# Patient Record
Sex: FEMALE | Race: BLACK OR AFRICAN AMERICAN | Employment: FULL TIME | URBAN - METROPOLITAN AREA
[De-identification: names, ages, dates, MRNs, and addresses within clinical notes are randomized per-mention and may not be internally consistent; named-entity substitution may affect disease eponyms.]

---

## 2018-05-10 ENCOUNTER — TELEPHONE (OUTPATIENT)
Dept: BEHAVIORAL HEALTH | Facility: CLINIC | Age: 38
End: 2018-05-10

## 2018-05-10 NOTE — TELEPHONE ENCOUNTER
S:  5/10/18 Received call from Haleigh Parra (therapist/[phone])   in conference with client referring [facility] to [facility] [place].  Reported client  sees her PCP for medication management.   B:  Reported the client has been having high Anxiety, Depression and   symptoms of PTSD . Also reported client has been endorsing Suicidal  Ideation w/o plan or intent, denies SI at this time, however therapist   feels client will be at high risk for suicide if more [facility] [place] is   not followed up.  A:  [facility] [place]  Medication: Lorazepam, Sertraline  Denies medical concern  Denies drugs and alcohol abuse.   Denies legal issues.   R:  Pool message to  OP. JT

## 2018-05-16 ENCOUNTER — HOSPITAL ENCOUNTER (OUTPATIENT)
Dept: BEHAVIORAL HEALTH | Facility: CLINIC | Age: 38
Discharge: HOME | End: 2018-05-16
Attending: PSYCHIATRY & NEUROLOGY | Admitting: PSYCHIATRY & NEUROLOGY
Payer: COMMERCIAL

## 2018-05-16 ENCOUNTER — BEH TREATMENT PLAN (OUTPATIENT)
Dept: BEHAVIORAL HEALTH | Facility: CLINIC | Age: 38
End: 2018-05-16
Attending: PSYCHIATRY & NEUROLOGY

## 2018-05-16 PROCEDURE — 90791 PSYCH DIAGNOSTIC EVALUATION: CPT

## 2018-05-16 ASSESSMENT — PAIN SCALES - GENERAL: PAINLEVEL: MILD PAIN (2)

## 2018-05-16 NOTE — PROGRESS NOTES
Acknowledgement of Current Treatment Plan       I have reviewed my treatment plan with my therapist / counselor on 6/27/2018. I agree with the plan as it is written in the electronic health record.    Name Signature   Mackenzie Garnica    Name of Therapist / Counselor    Yimi GROVES , Rhoda Costello RN, Mariposa Rehman [facility]

## 2018-05-16 NOTE — PROGRESS NOTES
"MY COPING PLAN FOR SAFETY          Things that are most important to me & reasons for living: \"My Children\"              My relapse Warning Signs: If triggered by  if he is aggressive,  job is a trigger since it is stressful - anxiety  I will make my environment safer by: nothing required  When in crisis, I will use the following coping skills: (relaxation/self-soothing/distraction/activity):  Reading more positive things, music, distract self by being with kids  I will use My Support System:   Personal Supports: I can ask for reminders, support, or for them to stay with me  Trusted Friend/s:   None identified  Family Member/s : none identified                  Professional Supports: I can ask for med changes, emergency appointments, help  Psychiatrist: Zeyad Scherer  Therapist: Haleigh       Crisis Lines I can call to discuss options and to access support:  [facility]:    [place] ([facility]) [phone]   [place] (Mental Health Crisis Program) [phone]   [place]  [phone]   [place] ([facility]) [phone]   [place] [phone]   [place] ([facility]) [phone]   [place] ([place], [place], [place], [place], [facility]) [phone]   Other Crisis Lines:   [facility] (Counseling) [phone]   National Suicide Prevention [phone]   Suicide Prevention [phone]      X   I will attend my Treatment Program and talk to my one of my therapists:      X  I agree that I will report any current / recent thoughts, impulses or plans of suicide,           homicide, self injurious behavior or substance use .   X   I agree that I will not act on the above symptoms, but will follow the above plan         instead.  I can also go to the Emergency Department at [facility]: [facility] [phone] or call: 911                                "
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
## 2018-05-16 NOTE — PROGRESS NOTES
"Initial Individual Treatment Plan     Patient: Mackenzie Garnica   MRN: [id number]  : 1980  Age: 37 year old  Sex: female    Diagnostic Assessment Date / Date of Initial Individual Treatment Plan: 18      Immediate Health Concerns:  No     Immediate Safety Concerns:  Yes. Identify safety concern and plan to address: Discussed options of ED evaluation. Ultimately decided to first try the PHP. Does not have any immedate safety concners or palns to harm self or others. Acknowldges plan to go  to ED as needed or call COPE (explained COPE)    Identify the issues to be addressed in treatment:  Symptom Management, Personal Safety, Community Resources/Discharge Planning, Abstinence/Relapse Prevention, Develop / Improve Independent Living Skills and Develop Socialization / Interpersonal Relationship Skills    Client Initial Individualized Goals for Treatment: \"I would like to be able to manage my depression and anxiety\"    Initial Treatment suggestions for the client during the time between Diagnostic Assessment and completion of the Individualized Treatment Plan:  Follow Safety Plan  Abstain from Substance Use   Ask for more information, support and/or assistance as needed.  Follow up with providers/community supports as needed: therapist, PCP  Report increases or changes in symptoms to staff.  Report any personal safety concerns to staff.   Take medications as prescribed.  Report medication changes and/or side effects to staff.  Attend and participate in groups as scheduled or notify staff if unable to do so.  Report any use of substances to staff as this may impact your symptoms and/or  personal safety.  Notify staff if you have any other issues that need to be addressed. This may include  any current abuse / neglect / exploitation or other vulnerability.  Follow recommendations of your treatment team and discuss concerns if not in  agreement.     Treatment Team Responsible: [facility] ()  "     Therapeutic Interventions/Treatment Strategies may include:  Support, Redirection, Feedback, Limit/Boundaries, Safety Assessments, Structured Activity, Problem Solving, Clarification, Education, Motivational Enhancement and Relapse Prevention as needed.    Tracie Plasencia, ITALIASW

## 2018-05-16 NOTE — TELEPHONE ENCOUNTER
----- Message from RICHARD Arango sent at 5/16/2018  3:32 PM CDT -----  Regarding: new start  Mackenzie will be starting in the Partial Program on tomorrow, Thursday, 5/17/18.   She has an out of state BCBS policy, auth required    I'm working on the DA right now so if have questions, please call me directly.  Thanks!

## 2018-05-17 NOTE — TELEPHONE ENCOUNTER
----- Message from RICHARD Arango sent at 5/17/2018  9:15 AM CDT -----  Mackenzie will NOT be starting today in Partial. Will be starting in PHP tomorrow.  Thank you.

## 2018-05-18 ENCOUNTER — HOSPITAL ENCOUNTER (OUTPATIENT)
Dept: BEHAVIORAL HEALTH | Facility: CLINIC | Age: 38
End: 2018-05-18
Attending: PSYCHIATRY & NEUROLOGY
Payer: COMMERCIAL

## 2018-05-18 PROBLEM — F41.1 GAD (GENERALIZED ANXIETY DISORDER): Status: ACTIVE | Noted: 2018-05-18

## 2018-05-18 PROCEDURE — H0035 MH PARTIAL HOSP TX UNDER 24H: HCPCS

## 2018-05-18 ASSESSMENT — ANXIETY QUESTIONNAIRES
5. BEING SO RESTLESS THAT IT IS HARD TO SIT STILL: NOT AT ALL
6. BECOMING EASILY ANNOYED OR IRRITABLE: MORE THAN HALF THE DAYS
3. WORRYING TOO MUCH ABOUT DIFFERENT THINGS: NEARLY EVERY DAY
7. FEELING AFRAID AS IF SOMETHING AWFUL MIGHT HAPPEN: NOT AT ALL
GAD7 TOTAL SCORE: 11
1. FEELING NERVOUS, ANXIOUS, OR ON EDGE: SEVERAL DAYS
2. NOT BEING ABLE TO STOP OR CONTROL WORRYING: MORE THAN HALF THE DAYS
IF YOU CHECKED OFF ANY PROBLEMS ON THIS QUESTIONNAIRE, HOW DIFFICULT HAVE THESE PROBLEMS MADE IT FOR YOU TO DO YOUR WORK, TAKE CARE OF THINGS AT HOME, OR GET ALONG WITH OTHER PEOPLE: VERY DIFFICULT

## 2018-05-18 ASSESSMENT — PATIENT HEALTH QUESTIONNAIRE - PHQ9: 5. POOR APPETITE OR OVEREATING: NEARLY EVERY DAY

## 2018-05-18 NOTE — PROGRESS NOTES
"  Adult Mental Health Outpatient Group Therapy Progress Note      Client Initial Individualized Goals for Treatment: \"I would like to be able to manage my depression and anxiety\".      See Initial Treatment suggestions for the client during the time between Diagnostic Assessment and completion of the Master Individualized Treatment Plan.    Treatment Goals:     Follow Safety Plan  Abstain from Substance Use   Ask for more information, support and/or assistance as needed.  Follow up with providers/community supports as needed: therapist, PCP  Report increases or changes in symptoms to staff.  Report any personal safety concerns to staff.   Take medications as prescribed.  Report medication changes and/or side effects to staff.  Attend and participate in groups as scheduled or notify staff if unable to do so.  Report any use of substances to staff as this may impact your symptoms and/or                      personal safety.  Notify staff if you have any other issues that need to be addressed. This may include              any current abuse / neglect / exploitation or other vulnerability.  Follow recommendations of your treatment team and discuss concerns if not in            agreement.    Area of Treatment Focus:  Symptom Management, Personal Safety and Develop / Improve Independent Living Skills, wellness    Therapeutic Interventions/Treatment Strategies:  Support, Feedback, Safety Assessments, Problem Solving and Education    Response to Treatment Strategies:  Accepted Feedback, Listened, Attentive and Alert    Name of Group:  Weekend Planning  Group Time:11:00-11:50    Description and Outcome:  Group spent time discussing plans for the weekend and reflecting on this weeks programming. Patients were to fill out worksheet that identified the skills they learned this week and ways they have implemented them into their lives. They also planned activities for their weekend. Group members were encouraged to give others " feedback and brainstorm strategies to manage symptoms.     Assessment  Appearance/ Mood: anxious behavior,  range in affect, anxious mood. Affect was consistent with mood.  Appropriate dress, well-groomed and was cooperative within group.   Thought Process: Linear and logical, productive and goal directed. Contributed to group conversation.  Stated she felt nervous.   Behavior: Cooperative, interacted within the group, listened and was respectful to others  Areas for growth: Patient would benefit from balancing her schedule to incorporate movement, productivity, and other coping skills besides laying in bed. Patient was open to these ideas and was going to focus on self care this weekend: brushing teeth, meal time, etc.     Treatment goal acknowledgement: Patient continues to work towards treatment plan goals and the skills learned today will benefit on recovery.     Response: Patient was able to contribute to conversation and discussed important strategies for symptom management. Verbalized understanding.         Is this a Weekly Review of the Progress on the Treatment Plan?  No

## 2018-05-18 NOTE — PROGRESS NOTES
"Group Therapy Progress Notes     Client Initial Individualized Goals for Treatment: \"I would like to be able to manage my depression and anxiety\"     Initial Treatment suggestions for the client during the time between Diagnostic Assessment and completion of the Individualized Treatment Plan:  Follow Safety Plan  Abstain from Substance Use   Ask for more information, support and/or assistance as needed.  Follow up with providers/community supports as needed: therapist, PCP  Report increases or changes in symptoms to staff.  Report any personal safety concerns to staff.   Take medications as prescribed.  Report medication changes and/or side effects to staff.  Attend and participate in groups as scheduled or notify staff if unable to do so.  Report any use of substances to staff as this may impact your symptoms and/or personal safety.  Notify staff if you have any other issues that need to be addressed. This may include  any current abuse / neglect / exploitation or other vulnerability.  Follow recommendations of your treatment team and discuss concerns if not in  agreement.          Area of Treatment Focus:  Symptom Management, Personal Safety, Community Resources/Discharge Planning, Abstinence/Relapse Prevention, Develop / Improve Independent Living Skills and Develop Socialization / Interpersonal Relationship Skills    Therapeutic Interventions/Treatment Strategies:  Support, Safety Assessments, Structured Activity and Education    Response to Treatment Strategies:  Accepted Feedback, Listened, Focused on Goals, Attentive and Accepted Support    Name of Group:  Relaxation      Progress Note and Description:  Mackenzie gained more of a sense of self by engaging in a mindfulness exercise using art media. She was engaged in creating a picture illustrating where she is at in her recovery as well as her therapeutic needs. She processed feelings and the nature of her inner self talk. Mackenzie prcoessed her picture in " group. She has a supportive  and 7 children. She plans on resuming the partial program on Monday. Denies S/I or safety issues upon leaving the program today.     Is this a Weekly Review of the Progress on the Treatment Plan?  No

## 2018-05-19 ASSESSMENT — PATIENT HEALTH QUESTIONNAIRE - PHQ9: SUM OF ALL RESPONSES TO PHQ QUESTIONS 1-9: 22

## 2018-05-19 ASSESSMENT — ANXIETY QUESTIONNAIRES: GAD7 TOTAL SCORE: 11
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
## 2018-05-20 NOTE — PROGRESS NOTES
Mackenzie was not in attendance for first two group of the day. [place] that she had arrived late due to another appointment.

## 2018-05-21 ENCOUNTER — TELEPHONE (OUTPATIENT)
Dept: BEHAVIORAL HEALTH | Facility: CLINIC | Age: 38
End: 2018-05-21

## 2018-05-21 NOTE — TELEPHONE ENCOUNTER
OON auth is for 6 days of PHP,  Window 5/18/18 to 5/25/18. auth # is [id number]. Concurrent review with Neli at  [phone] on 5-25-18.   Per ins co,  Pt is OON as there is an in network agency in the [facility] area:  [facility] in [place], phone .   jpm

## 2018-05-21 NOTE — PROGRESS NOTES
Individualized Treatment Plan     Date of Plan: 18    Name: Mackenzie Garnica MRN: [id number]  :   1980    Programs: Adult Partial Hospitalization Program    DSM5 Diagnosis:  Major depressive disorder.   Generalized anxiety disorder.     Panic disorder without agoraphobia.   Posttraumatic stress disorder.   Alcohol use disorder.     Team Members Contributing to Plan:  Jolynn Pritchard MA/JACKSON; EH Vivar, [facility]; Abilio Miguel RN-BC; Marbin Crane MOTRMario, [facility] Center[facility]; Tali Keenan, OTR/L; Mariposa Rehman, [facility] CenterSW, BC-DMT; David Kelsey MD    Client Strengths:  Mackenzie identifies her personal strengths as: caring, creative, educated, empathetic, employed, good listener, intelligent, open to learning, open to suggestions / feedback, responsible parent, supportive, wants to learn, willing to ask questions and work history I believe in god    Client Participation in Plan:  Contributed to goals and plan   Agrees with plan   Received copy of treatment plan     Areas of Vulnerability:   Suicidal Ideation -   Anxiety with/without panic attacks  Active/history of addiction/substance abuse  Depressive symptoms  Trauma/Abuse/Neglect.     Long-Term Goals:  Knowledge about illness and management of symptoms   Maintenance of personal safety     Abuse Prevention Plan:  Safe, therapeutic environment   Safety coping plan as needed   Education regarding illness and skill development   Coordination with care providers     Discharge Criteria:  Satisfactory progress toward treatment goals   Improvement re: identified problems and symptoms   Ability to continue recovery at next level of service   Has a discharge plan in place   Has safety/coping plan in place      Anticipated Discharge Date: TBD (pending authorization if needed)    Areas of Treatment Focus       Area of Treatment Focus:  Personal Safety  Start Date:    18      Problem Description:    After gathering the above information, Mackenzie  presents the following  "high risk factors for suicide: Recent substance abuse, Poor sleep, Panic,extreme anxiety, Extreme psychic pain and Hopelessness, Worthlessness.  Mackenzie reports the following current fears or concerns for personal safety: Has SI thoughts. Did not share her paln. Has very limited support and feels she has alot of stress realted to daily life. After further discussion, she did acknowldge that she does not have immedate plans of harming herself and denies any and all thoughts of harming others. She agrees to call [employer] or [employer] if needed. She agrees to stop using alcohol to numb feelings.     Mackenzie has the following Protective Factors: Children in the home , Sense of responsibility to family, Religiosity, Positive problem-solving skills and Positive therapeutic releationships     Goal: Target Date: Ongoing Status: Active  Client will notify staff when needing assistance to develop or implement a coping plan to manage suicidal or self injurious urges.  Client will use coping plan for safety, as needed.     Upon review of the patient interview and identification of high risk factors determine individualized safety strategies alternatives and treatment plan interventions.   Client consented to co-developed safety plan, which includes  1.  calling [employer], [employer]  2. distraction with being with children.     Progress:   5/23/18: Endorses feeling safe today.        Treatment Strategies:   Assist clients in establishing / strengthening support network  Engage in safety planning when indicated  Facilitate increased self awareness     Area of Treatment Focus:  Symptom Management  Start Date:   5/23/18      Description:  \"I would like to be able to manage my depression and anxiety\".      Goal: Target Date: Ongoing Status: Active  Learn 1-2 ways to depersonalize emotional anger from others in a healthy way. Continue to practice mindfulness and find 1-2 ways to practice this skill.        Progress:   5/23/18: Would like to learn ways to " manage stressors.        Treatment Strategies:   Assist clients in establishing / strengthening support network  Assist to identify treatment goals  Engage in safety planning when indicated     Area of Treatment Focus:  Develop / Improve Independent Living Skills  Start Date:    5/23/18      Goal: Target Date: Ongoing Status: continue  In life skills Mackenzie will:  1) learn, practice, generalize 2 skills/strategies for improved lifestyle balance focusing on self compassion and self care.  2) learn, practice, generalize sensory and mindfulness based self regulation skills/strategies to support improved participation in important life roles and relationships.      Progress:   5/23/18: Mackenzie met with team members. Discussed program, process, progress. Discussed and set goals.  6/5/18: Mackenzie has learned about balance, importance of self-care and compassion as well as ways to practice mindfulness in her everyday life. She would benefit from continue work on goals as she transitions into [facility].       Treatment Strategies:   Assist clients in establishing / strengthening support network  Assist with discharge planning  Facilitate increased self awareness  Provide education regarding goal integration, lifestyle balance/routine/structure, leisure, roles and values, focused structured activity, sensory and mindfulness based self-regulation skills, interpersonal communication skills, self compassion, self awareness         Area of Treatment Focus:  Community Resources/Discharge Planning  Start Date:    5/23/18        Goal: Target Date: by discharge date Status: Active  Will develop Aftercare Planning.   Will learn ways to expand support.       Progress:   Psychiatrist/Med Mgmt: PCP . Wants to find a psychiatrist. Staff will provide resources.    Individual Therapist: Haleigh Parra    Treatment Strategies:   Assist clients in establishing / strengthening support network  Assist with discharge planning

## 2018-05-21 NOTE — TELEPHONE ENCOUNTER
Called patient to ask why they are not in group today. Requested patient to return phone call to ensure safety.

## 2018-05-21 NOTE — PROGRESS NOTES
Acknowledgement of Current Treatment Plan       I have reviewed my treatment plan with my therapist / counselor on 5/23/18.   I agree with the plan as it is written in the electronic health record.    Name:      Signature:  Mackenzie Kelsey MD  Psychiatrist    Jolynn Pritchard MA,   Psychotherapist    Teressa Arias MSW, [facility]  Psychotherapist    Abilio Miguel, FANY-BC  Nurse Liajuli Keenan, OTR/L  Occupational Therapist    Marbin Crane MOTR/L, CBIS  Occupational Therapist    Mariposa Rehman, [facility], BC-DMT  Psychotherapist

## 2018-05-22 ENCOUNTER — HOSPITAL ENCOUNTER (OUTPATIENT)
Dept: BEHAVIORAL HEALTH | Facility: CLINIC | Age: 38
End: 2018-05-22
Attending: PSYCHIATRY & NEUROLOGY
Payer: COMMERCIAL

## 2018-05-22 ENCOUNTER — TELEPHONE (OUTPATIENT)
Dept: BEHAVIORAL HEALTH | Facility: CLINIC | Age: 38
End: 2018-05-22

## 2018-05-22 PROCEDURE — H0035 MH PARTIAL HOSP TX UNDER 24H: HCPCS

## 2018-05-22 NOTE — PROGRESS NOTES
"Adult Mental Health Outpatient Group Therapy Progress Note         Client Initial Individualized Goals for Treatment: \"I would like to be able to manage my depression and anxiety\".        See Initial Treatment suggestions for the client during the time between Diagnostic Assessment and completion of the Master Individualized Treatment Plan.     Treatment Goals:      Follow Safety Plan  Abstain from Substance Use   Ask for more information, support and/or assistance as needed.  Follow up with providers/community supports as needed: therapist, PCP  Report increases or changes in symptoms to staff.  Report any personal safety concerns to staff.   Take medications as prescribed.  Report medication changes and/or side effects to staff.  Attend and participate in groups as scheduled or notify staff if unable to do so.  Report any use of substances to staff as this may impact your symptoms and/or  personal safety.  Notify staff if you have any other issues that need to be addressed. This may include  any current abuse / neglect / exploitation or other vulnerability.  Follow recommendations of your treatment team and discuss concerns if not in agreement.        Area of Treatment Focus:  Personal Safety and Community Resources/Discharge Planning    Therapeutic Interventions/Treatment Strategies:  Support, Feedback, Safety Assessments, Structured Activity, Problem Solving and Education    Response to Treatment Strategies:  Accepted Feedback, Gave Feedback, Listened, Focused on Goals, Attentive, Accepted Support and Alert    Name of Group:  Mindfulness    Description and Outcome:  Discussed impact of mindfulness on depression and anxiety and relationship to non-judgmental awareness of emotions. Group dynamics became intense unexpected;y (three other group members had a loud, disrespectful conflict with each other). Processed these events with remaining group members. Lorraine acknowledged that this was quite difficult for her, " reminded her of conflict situations that happen in her house at times. Validated her feelings.    Is this a Weekly Review of the Progress on the Treatment Plan?  No

## 2018-05-22 NOTE — PROGRESS NOTES
"Adult Mental Health Outpatient Group Therapy Progress Note         Client Initial Individualized Goals for Treatment: \"I would like to be able to manage my depression and anxiety\".        See Initial Treatment suggestions for the client during the time between Diagnostic Assessment and completion of the Master Individualized Treatment Plan.     Treatment Goals:      Follow Safety Plan  Abstain from Substance Use   Ask for more information, support and/or assistance as needed.  Follow up with providers/community supports as needed: therapist, PCP  Report increases or changes in symptoms to staff.  Report any personal safety concerns to staff.   Take medications as prescribed.  Report medication changes and/or side effects to staff.  Attend and participate in groups as scheduled or notify staff if unable to do so.  Report any use of substances to staff as this may impact your symptoms and/or  personal safety.  Notify staff if you have any other issues that need to be addressed. This may include  any current abuse / neglect / exploitation or other vulnerability.  Follow recommendations of your treatment team and discuss concerns if not in agreement.        Area of Treatment Focus:  Personal Safety and Community Resources/Discharge Planning    Therapeutic Interventions/Treatment Strategies:  Support, Feedback, Safety Assessments, Structured Activity, Problem Solving and Education    Response to Treatment Strategies:  Accepted Feedback, Gave Feedback, Listened, Focused on Goals, Attentive, Accepted Support and Alert    Name of Group:  Aftercare planning [id number]     Description and Outcome:  Aftercare planning  Client participated in an aftercare planning.  Client completed an exercise to assess needs, plan for skills to address the identified need. Client worked with group members to get ideas and resources for assistance in identified areas. Client discussed fears of returning to work and planning for requesting what " she needs to perform her job. Client demonstrated understanding of session content by creating a schedule for future use.    Is this a Weekly Review of the Progress on the Treatment Plan?  No

## 2018-05-22 NOTE — TELEPHONE ENCOUNTER
----- Message from Jamie Mcleod sent at 5/22/2018 11:53 AM CDT -----  Regarding: need more appts for PHP  Pt needs more appts for PHP starting 5/22/18 under Dr. Kelsey please.  Thanks!

## 2018-05-23 ENCOUNTER — HOSPITAL ENCOUNTER (OUTPATIENT)
Dept: BEHAVIORAL HEALTH | Facility: CLINIC | Age: 38
End: 2018-05-23
Attending: PSYCHIATRY & NEUROLOGY
Payer: COMMERCIAL

## 2018-05-23 DIAGNOSIS — F33.2 SEVERE RECURRENT MAJOR DEPRESSION WITHOUT PSYCHOTIC FEATURES (H): Primary | ICD-10-CM

## 2018-05-23 PROCEDURE — H0035 MH PARTIAL HOSP TX UNDER 24H: HCPCS

## 2018-05-23 RX ORDER — SERTRALINE HYDROCHLORIDE 100 MG/1
100 TABLET, FILM COATED ORAL DAILY
Qty: 30 TABLET | Refills: 0 | Status: SHIPPED | OUTPATIENT
Start: 2018-05-23 | End: 2021-10-14

## 2018-05-23 NOTE — PROGRESS NOTES
"  Adult Mental Health Outpatient Group Therapy Progress Note      Client Initial Individualized Goals for Treatment: \"I would like to be able to manage my depression and anxiety\".      See Initial Treatment suggestions for the client during the time between Diagnostic Assessment and completion of the Master Individualized Treatment Plan.    Treatment Goals:     Follow Safety Plan  Abstain from Substance Use   Ask for more information, support and/or assistance as needed.  Follow up with providers/community supports as needed: therapist, PCP  Report increases or changes in symptoms to staff.  Report any personal safety concerns to staff.   Take medications as prescribed.  Report medication changes and/or side effects to staff.  Attend and participate in groups as scheduled or notify staff if unable to do so.  Report any use of substances to staff as this may impact your symptoms and/or                      personal safety.  Notify staff if you have any other issues that need to be addressed. This may include              any current abuse / neglect / exploitation or other vulnerability.  Follow recommendations of your treatment team and discuss concerns if not in            agreement.    Area of Treatment Focus:  Symptom Management, Develop / Improve Independent Living Skills, Develop Socialization / Interpersonal Relationship Skills and Physical Health     Therapeutic Interventions/Treatment Strategies:  Support, Redirection, Feedback, Structured Activity, Problem Solving, Education and Motivational Enhancement Therapy    Response to Treatment Strategies:  Accepted Feedback, Listened, Attentive and Alert    Name of Group:  Exercise  Group Time: 11:00-11:50    Description and Outcome:  Group discussed the importance of Exercise. The 3 different types of exercise were discussed, cardio-vascular, strength and flexibility.Group talked about the benefits to exercise and discussed was to incorporate exercise into their " weekly routines. Group discussed mental barriers to exercise, safety tips while exercising, motivation techniques to exercise and group worked through case study to problem solve ways to incorporate exercise into daily life. Mindfulness chair yoga was practiced for 15 minutes.   Assessment  Appearance/ Mood: Calm behavior, range in affect, stable mood throughout group. Affect was consistent with mood.  Appropriate dress, well-groomed and was cooperative within group.   Thought Process: Linear and logical, productive and goal directed. Contributed to group conversation.   Behavior: Cooperative, interacted within the group, listened and was respectful to others  Areas for growth: would benefit from doing exercise to increase mood    Understanding of the lesson: verbalized understanding by participating in group conversation.       Is this a Weekly Review of the Progress on the Treatment Plan

## 2018-05-23 NOTE — PROGRESS NOTES
[facility]   Dr. Kelsey's Psychiatric Progress Note  2018      Patient:  Mackenzie Garnica   Medical Record Number:  [id number]  :  1980          Interim History:   The patient's care was discussed with the treatment team and chart notes were reviewed.  Patient is doing better, less depressed.  Crying every day.  Anxiety is gone last few days.  She had SI off and on since middle school.  Suicidal thoughts are more last couple of years due to losses in her life.  Feels safe.  No plan or intent to hurt self.  Admits it's hard to come to group, but if she weren't here, she'd be home drinking ETOH.      Psychiatric ROS:  Mood:   less depressed; not anxious;  middle of the day is better  Sleep:normal  Appetite:  Not hungry but makes selfeat  Eating:normal  Energy Level:LOW  Concentration/Memory Problems:  NO  Suicidal Thoughts:Yes , off and on, no plan or intent; able to contract no self harm  Homicidal Thoughts:No  Psychotic Symptoms: No  Medication Side Effects:No  Medication Compliance:Yes   Physical Complaints:negative         Medications:     PAST MEDS:  Zoloft and Ativan    Current Outpatient Prescriptions   Medication Sig     LORazepam (ATIVAN PO) Take by mouth daily as needed for anxiety     SERTRALINE HCL PO Take 50 mg by mouth daily     No current facility-administered medications for this encounter.              Allergies:   No Known Allergies         Psychiatric Examination:   There were no vitals taken for this visit.  Weight is 0 lbs 0 oz  There is no height or weight on file to calculate BMI.    Appearance:  awake, alert and adequately groomed  Attitude:  cooperative  Eye Contact:  fair  Mood:  depressed  Affect:  mood congruent  Speech:  clear, coherent  Psychomotor Behavior:  no evidence of tardive dyskinesia, dystonia, or tics  Throught Process:  logical  Associations:  no loose associations  Thought Content:  no evidence of psychotic thought and  intermittent suicidal thoughts without plan or intent  Insight:  fair  Judgement:  fair  Oriented to:  time, person, and place  Attention Span and Concentration:  intact  Recent and Remote Memory:  intact  Gait:Normal    Risk/Potential for Dangerousness:  Multiple Active Diagnoses:HIGH  Self Care:HIGH  Suicide:MODERATE  Assault:LOW  Self Injurious Behaviors:LOW  Inappropriate Sexual Behavior:LOW         Labs:   No results found for this or any previous visit (from the past 24 hour(s)).     No results found for this or any previous visit (from the past 1008 hour(s)).      Impression:   This is a 37 year old female continues PHP for mood stabilization.  Mood is a little better.  Structure of the group keeps her from drinking.           DIagnoses:     Axis I:     1.  Major depressive disorder.   2.  Generalized anxiety disorder.     3.  Panic disorder without agoraphobia.   4.  Posttraumatic stress disorder.   5.  Alcohol use disorder.   Axis II:  No diagnosis   Axis III:  Obesity.            Plan:     Continue [facility].   IncreasedZoloft from 50 mg daily to 100 mg daily.   We will reevaluate psychotropic medications during her partial hospital stay.   Expect stabilization and completion of Partial Hospital Program.   Ms. Garnica gets medication management from her primary care physician, Dr. Zeyad Scherer at [facility] and sees therapist, Haleigh Diaz in [place].     David Kelsey MD

## 2018-05-24 ENCOUNTER — HOSPITAL ENCOUNTER (OUTPATIENT)
Dept: BEHAVIORAL HEALTH | Facility: CLINIC | Age: 38
End: 2018-05-24
Attending: PSYCHIATRY & NEUROLOGY
Payer: COMMERCIAL

## 2018-05-24 PROCEDURE — H0035 MH PARTIAL HOSP TX UNDER 24H: HCPCS

## 2018-05-24 NOTE — PROGRESS NOTES
"Adult Mental Health Outpatient Group Therapy Progress Note     Client Initial Individualized Goals for Treatment: I would like to be able to manage my depression and anxiety\".     Client will notify staff when needing assistance to develop or implement a coping plan to manage suicidal or self injurious urges.  Client will use coping plan for safety, as needed.  Learn 1-2 ways to depersonalize emotional anger from others in a healthy way. Continue to practice mindfulness and find 1-2 ways to practice this skill.    1) learn, practice, generalize 2 skills/strategies for improved lifestyle balance focusing on self compassion and self care.  2) learn, practice, generalize sensory and mindfulness based self regulation skills/strategies to support improved participation in important life roles and relationships.  Will develop Aftercare Planning.   Will learn ways to expand support.    Area of Treatment Focus:  Symptom Management, Personal Safety and Develop Socialization / Interpersonal Relationship Skills    Therapeutic Interventions/Treatment Strategies:  Support, Feedback, Safety Assessments, Structured Activity, Problem Solving, Clarification and Education    Response to Treatment Strategies:  Accepted Feedback, Gave Feedback, Listened, Focused on Goals, Attentive and Alert    Name of Group:  Interpersonal communication [id number]     Description and Outcome:  Interpersonal Communication  Client participated in a session on communication strategies.  Information on biology, environment, and previous experience affecting arousal was presented.  That was linked to communication and skills were presented to decrease arousal prior to communication.  Components of whole communication were presented and discussed among group members.  Examples were generated and problem solving barriers to effective communication were presented and discussed among group members. Client asked questions to better understand using the skills " with different people in her life  Client demonstrated understanding by participating in problem solving and giving feedback.    Is this a Weekly Review of the Progress on the Treatment Plan?  No

## 2018-05-24 NOTE — PROGRESS NOTES
"Adult Mental Health Outpatient Group Therapy Progress Note     Date: 5/22/18    Client Initial Individualized Goals for Treatment: \"I would like to be able to manage my depression and anxiety\".       See Initial Treatment suggestions for the client during the time between Diagnostic Assessment and completion of the Master Individualized Treatment Plan    Follow Safety Plan  Abstain from Substance Use   Ask for more information, support and/or assistance as needed.  Follow up with providers/community supports as needed: therapist, PCP  Report increases or changes in symptoms to staff.  Report any personal safety concerns to staff.   Take medications as prescribed.  Report medication changes and/or side effects to staff.  Attend and participate in groups as scheduled or notify staff if unable to do so.  Report any use of substances to staff as this may impact your symptoms and/or personal safety.  Notify staff if you have any other issues that need to be addressed. This may include any current abuse / neglect / exploitation or other vulnerability.  Follow recommendations of your treatment team and discuss concerns if not in agreement.     Area of Treatment Focus:  Symptom Management, Personal Safety and Develop / Improve Independent Living Skills    Therapeutic Interventions/Treatment Strategies:  Support, Feedback, Safety Assessments, Structured Activity, Education and weighted materials and sensory modalities    Response to Treatment Strategies:  Accepted Feedback, Listened, Accepted Support, Alert and Distracted    Name of Group: Life Skills [id number]     Description and Outcome:  Mackenzie participated in a psycho-educational group focused on lifestyle balance with an emphasis on leisure. Psycho-education on the benefits of leisure activity specifically the positive impact it can have on activating the parasympathetic nervous system. Group members engaged in an experiential group semi structured leisure activity " which incorporates both communication skills and opportunity to work on self-talk. Mackenzie is engaged, bright affect throughout, validating to peers. Mackenzie would benefit from additional opportunities to practice the content to be able to generalize it to her everyday life with increased intentionality, consistency, and efficacy.     Is this a Weekly Review of the Progress on the Treatment Plan?  No

## 2018-05-24 NOTE — PROGRESS NOTES
"  Adult Mental Health Outpatient Group Therapy Progress Note     Client Initial Individualized Goals for Treatment: I would like to be able to manage my depression and anxiety\".      Client will notify staff when needing assistance to develop or implement a coping plan to manage suicidal or self injurious urges.  Client will use coping plan for safety, as needed.  Learn 1-2 ways to depersonalize emotional anger from others in a healthy way. Continue to practice mindfulness and find 1-2 ways to practice this skill.    1) learn, practice, generalize 2 skills/strategies for improved lifestyle balance focusing on self compassion and self care.  2) learn, practice, generalize sensory and mindfulness based self regulation skills/strategies to support improved participation in important life roles and relationships.  Will develop Aftercare Planning.   Will learn ways to expand support.     Area of Treatment Focus:  Symptom Management and Develop Socialization / Interpersonal Relationship Skills    Therapeutic Interventions/Treatment Strategies:  Support, Feedback, Structured Activity and Education    Response to Treatment Strategies:  Accepted Feedback, Listened, Attentive, Accepted Support and Alert    Name of Group:  Mental Health Management, [id number], Self Awareness [id number]     Description and Outcome:  Mental health management: The group participated in a structured, psychoeducational discussion and activity comparing and contrasting self esteem and self compassion.  Self esteem was presented as a useful, but limiting construct, as if is prone to change with circumstances and relies on comparisons to others.  On the other hand, self compassion can be described as breathing the self kindly, especially during struggle.  Self compassion facilitates connection to others.  Handouts, including exercises to practise self compassion, were given. Mackenzie verbalized understanding of topic by sharing her struggle with being " kind to self.  She voiced a willingness to practise self compassion.     Self Awareness:  The group was provided with a guided breathing and warmup structure with focus on increasing self awareness and on providing an embodied experience.  Discussion included the importance of listening to body cues as a way of identifying emotion as well as accompanying needs and wants, and as a way of practising self compassion, authenticity, mindfulness, self expression,  and connection to other.  Client demonstrated understanding of session by participating in experiential and verbalized understanding by sharing observation of personal change during group. Mackenzie shared her observation that she was able to relax when focusing on her breath, taking time to be present with self.    Is this a Weekly Review of the Progress on the Treatment Plan?  No

## 2018-05-24 NOTE — PROGRESS NOTES
"Adult Mental Health Outpatient Group Therapy Progress Note     Client Initial Individualized Goals for Treatment: \"I would like to be able to manage my depression and anxiety\".        See Initial Treatment suggestions for the client during the time between Diagnostic Assessment and completion of the Master Individualized Treatment Plan    Follow Safety Plan  Abstain from Substance Use   Ask for more information, support and/or assistance as needed.  Follow up with providers/community supports as needed: therapist, PCP  Report increases or changes in symptoms to staff.  Report any personal safety concerns to staff.   Take medications as prescribed.  Report medication changes and/or side effects to staff.  Attend and participate in groups as scheduled or notify staff if unable to do so.  Report any use of substances to staff as this may impact your symptoms and/or personal safety.  Notify staff if you have any other issues that need to be addressed. This may include any current abuse / neglect / exploitation or other vulnerability.  Follow recommendations of your treatment team and discuss concerns if not in agreement.     Area of Treatment Focus:  Symptom Management, Personal Safety and Develop / Improve Independent Living Skills    Therapeutic Interventions/Treatment Strategies:  Support, Feedback, Safety Assessments, Structured Activity, Education and weighted materials and sensory modalities    Response to Treatment Strategies:  Accepted Feedback, Listened, Accepted Support, Alert and Distracted    Name of Group:  OT Life Skills [id number]     Description and Outcome:  Mackenzie participated in a psycho-educational group focused on learning about our sensory system and how sensory input can be helpful in managing symptoms and stress.  Mackenzie reported she was unfamiliar with this concept.  We also discussed how we can use different activities and types of sensory input throughout our day to help with self regulation. "  Mackenzie tried the weighted blanket and found it helpful.  Mood was depressed and anxious.  Appeared to have difficulty concentrating at times.  Open to support and ideas from others.  Client would benefit from additional opportunities to practice and implement content from this session.    Is this a Weekly Review of the Progress on the Treatment Plan?  No

## 2018-05-25 ENCOUNTER — HOSPITAL ENCOUNTER (OUTPATIENT)
Dept: BEHAVIORAL HEALTH | Facility: CLINIC | Age: 38
End: 2018-05-25
Attending: PSYCHIATRY & NEUROLOGY
Payer: COMMERCIAL

## 2018-05-25 PROCEDURE — H0035 MH PARTIAL HOSP TX UNDER 24H: HCPCS

## 2018-05-25 NOTE — PROGRESS NOTES
Came in for last 10 minutes of group, and will not be charged. After group patient wanted to express worry about this weekend. She stated that there is a memorial service for a family/friend that was killed a couple weeks ago. She stated she doesn't want to go because it would not be the best place to be. She was looking for support over the weekend as she is very nervous.   Resources were given:  Free/inexpensive things to do in [facility]  Volunteering opportunities  AA meeting-for support, found one in [facility] that was open to public    Mackenzie accepted these resources.
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
## 2018-05-29 ENCOUNTER — HOSPITAL ENCOUNTER (OUTPATIENT)
Dept: BEHAVIORAL HEALTH | Facility: CLINIC | Age: 38
End: 2018-05-29
Attending: PSYCHIATRY & NEUROLOGY
Payer: COMMERCIAL

## 2018-05-29 VITALS — BODY MASS INDEX: 41.12 KG/M2 | HEIGHT: 67 IN | WEIGHT: 262 LBS

## 2018-05-29 PROCEDURE — H0035 MH PARTIAL HOSP TX UNDER 24H: HCPCS

## 2018-05-29 NOTE — PROGRESS NOTES
"RN Review of Medical History / Physical Health Screen  Outpatient Behavioral Programs      CLIENT'S NAME: Mackenzie Garnica  MRN:   [id number]  :   1980 AGE:37 year old SEX: female    DATE OF DIAGNOSTIC ASSESSMENT: 18  DATE OF ADMISSION: 18   PROGRAM: [facility] ()      Following admission, the RN reviewed the following:    - Medical History / Physical Health Screen completed during the DA noted above.  - Immediate Health Concerns as noted on the Initial Individual Treatment Plan.    Client height and weight recorded by RN in epic: yes    BMI Review:  Was the patient informed of BMI? yes      Findings Above,  General nutrition education       RN Recommendations include: Continue to educate on nutrition and exercise. Educate on the importance to monitor regularly and if increase continues follow up with primary provider.       18 1500   Height and Weight   Height 5' 7\" (1.702 m)   Height Method Stated   Weight 262 lb (118.8 kg)   BSA (Calculated - sq m) 2.37   BMI (Calculated) 41.12       Abilio Miguel  2018      "

## 2018-05-29 NOTE — PROGRESS NOTES
"Adult Mental Health Outpatient Group Therapy Progress Note     Client Initial Individualized Goals for Treatment: I would like to be able to manage my depression and anxiety\".      Client will notify staff when needing assistance to develop or implement a coping plan to manage suicidal or self injurious urges.  Client will use coping plan for safety, as needed.  Learn 1-2 ways to depersonalize emotional anger from others in a healthy way. Continue to practice mindfulness and find 1-2 ways to practice this skill.    1) learn, practice, generalize 2 skills/strategies for improved lifestyle balance focusing on self compassion and self care.  2) learn, practice, generalize sensory and mindfulness based self regulation skills/strategies to support improved participation in important life roles and relationships.  Will develop Aftercare Planning.   Will learn ways to expand support.   Area of Treatment Focus:  Symptom Management, Personal Safety and Develop / Improve Independent Living Skills    Therapeutic Interventions/Treatment Strategies:  Support, Feedback, Safety Assessments, Structured Activity and Education    Response to Treatment Strategies:  Accepted Feedback, Listened, Attentive, Accepted Support and Alert    Name of Group:  .Relax and Review [id number]     Description and Outcome:  Client participated in an education session on techniques for self soothing and breathing for relaxation.  Client learned skills for paired relaxation and paced breathing.  Client reported some success trying the skills.  Client would benefit from additional opportunities to practice and implement content from this session.      Is this a Weekly Review of the Progress on the Treatment Plan?  Yes.      Are Treatment Plan Goals being addressed?  Yes, continue treatment goals      Are Treatment Plan Strategies to Address Goals Effective?  Yes, continue treatment strategies      Are there any current contracts in place?  No          "

## 2018-05-29 NOTE — PROGRESS NOTES
"Adult Mental Health Outpatient Group Therapy Progress Note     Date: 5/23/18    Client Initial Individualized Goals for Treatment: \"I would like to be able to manage my depression and anxiety\".    Treatment Goals:  1) Safety: Client will notify staff when needing assistance to develop or implement a coping plan to manage suicidal or self injurious urges.Client will use coping plan for safety, as needed.   2) Symptom Management: Mackenzie will learn 1-2 ways to depersonalize emotional anger from others in a healthy way. Continue to practice mindfulness and find 1-2 ways to practice this skill.    3) In life skills Mackenzie will:     learn, practice, generalize 2 skills/strategies for improved lifestyle balance focusing on self compassion and self care.    learn, practice, generalize sensory and mindfulness based self regulation skills/strategies to support improved participation in important life roles and relationships.  3) Discharge Preparation: Mackenzie will:     Will develop Aftercare Planning.     Will learn ways to expand support.         Area of Treatment Focus:  Symptom Management, Personal Safety and Develop / Improve Independent Living Skills    Therapeutic Interventions/Treatment Strategies:  Support, Feedback, Safety Assessments, Structured Activity, Education and weighted materials and sensory modalities    Response to Treatment Strategies:  Accepted Feedback, Listened, Accepted Support, Alert and Distracted    Name of Group: Life Skills: OT clinic  Time: 1:00-1:50     Description and Outcome: Mackenzie attended and participated in a structured life skills group where intervention focuses on learning, developing, and practicing evidence based skills and strategies through structured experiential psycho-education, or structured focused activity, designed to foster self-compassion and improve function in valued roles, routines, relationships, and promote independent living skills.      Mackenzie presents with calm " even affect and good focus throughout session. She was able to make choices independently and engage in her structured mindful activity with good skill. She engaged in milieu conversation around the process and appeared comfortable with group members. Validation and support provided as she worked. Mackenzie would benefit from additional opportunities to practice the content to be able to generalize it to her everyday life with increased intentionality, consistency, and efficacy.     Is this a Weekly Review of the Progress on the Treatment Plan?  No

## 2018-05-29 NOTE — PROGRESS NOTES
"Adult Mental Health Outpatient Group Therapy Progress Note     Client Initial Individualized Goals for Treatment: I would like to be able to manage my depression and anxiety\".      Client will notify staff when needing assistance to develop or implement a coping plan to manage suicidal or self injurious urges.  Client will use coping plan for safety, as needed.  Learn 1-2 ways to depersonalize emotional anger from others in a healthy way. Continue to practice mindfulness and find 1-2 ways to practice this skill.    1) learn, practice, generalize 2 skills/strategies for improved lifestyle balance focusing on self compassion and self care.  2) learn, practice, generalize sensory and mindfulness based self regulation skills/strategies to support improved participation in important life roles and relationships.  Will develop Aftercare Planning.   Will learn ways to expand support.     Area of Treatment Focus:  Symptom Management, Personal Safety and Develop / Improve Independent Living Skills    Therapeutic Interventions/Treatment Strategies:  Support, Feedback, Safety Assessments, Structured Activity, Problem Solving, Clarification and Education    Response to Treatment Strategies:  Accepted Feedback, Gave Feedback, Listened, Accepted Support and Distracted    Name of Group:  OT Life Skills [id number]     Description and Outcome:  Mackenzie participated in a discussion and written activity focused on building awareness of different states of alertness/arousal and how to make changes in order to better function in various life roles and relationships.  Mackenzie offered a few examples of different signs and symptoms of high and low arousal for her and a few activities she uses to make changes when needed.  Mackenzie presented with low energy and often was observed to have her eyes closed today.  Seemed open to additional sensory based coping skills suggested.  Concentration was poor.  Client would benefit from additional " opportunities to practice and implement content from this session.    Is this a Weekly Review of the Progress on the Treatment Plan?  No

## 2018-05-30 ENCOUNTER — HOSPITAL ENCOUNTER (OUTPATIENT)
Dept: BEHAVIORAL HEALTH | Facility: CLINIC | Age: 38
End: 2018-05-30
Attending: PSYCHIATRY & NEUROLOGY
Payer: COMMERCIAL

## 2018-05-30 PROCEDURE — H0035 MH PARTIAL HOSP TX UNDER 24H: HCPCS

## 2018-05-30 NOTE — PROGRESS NOTES
"Adult Mental Health Partial Hospitalization Group Therapy Progress Note     Date: 5/24/18    Client Initial Individualized Goals for Treatment: \"I would like to be able to manage my depression and anxiety\".    Treatment Goals:  1) Safety: Client will notify staff when needing assistance to develop or implement a coping plan to manage suicidal or self injurious urges.Client will use coping plan for safety, as needed.   2) Symptom Management: Mackenzie will learn 1-2 ways to depersonalize emotional anger from others in a healthy way. Continue to practice mindfulness and find 1-2 ways to practice this skill.    3) In life skills Mackenzie will:     learn, practice, generalize 2 skills/strategies for improved lifestyle balance focusing on self compassion and self care.    learn, practice, generalize sensory and mindfulness based self regulation skills/strategies to support improved participation in important life roles and relationships.  3) Discharge Preparation: Mackenzie will:     Will develop Aftercare Planning.     Will learn ways to expand support.         Area of Treatment Focus:  Symptom Management, Personal Safety and Develop / Improve Independent Living Skills    Therapeutic Interventions/Treatment Strategies:  Support, Feedback, Safety Assessments, Structured Activity, Education and weighted materials and sensory modalities    Response to Treatment Strategies:  Accepted Feedback, Listened, Accepted Support, Alert and Distracted    Name of Group: Life Skills: OT clinic  Time: 1:00-1:50     Description and Outcome: Mackenzie attended and participated in a structured life skills group where intervention focuses on learning, developing, and practicing evidence based skills and strategies through structured experiential psycho-education, or structured focused activity, designed to foster self-compassion and improve function in valued roles, routines, relationships, and promote independent living skills.      Mackenzie" presents with a calm even affect as she chooses and works quietly on a semi structured self expressive activity. She verbalized enjoying the process and found it was a healthy distraction. We discussed small simple ways she could integrate this type of activity into her weekly life, focusing on self compassion and the need for her to carve out time for herself, she received validation, support, ideas from her peers.  Mackenzie would benefit from additional opportunities to practice the content to be able to generalize it to her everyday life with increased intentionality, consistency, and efficacy.     Is this a Weekly Review of the Progress on the Treatment Plan?  No

## 2018-05-30 NOTE — PROGRESS NOTES
"Adult Mental Health Partial Hospitalization Group Therapy Progress Note     Date: 5/29/18    Client Initial Individualized Goals for Treatment: \"I would like to be able to manage my depression and anxiety\".    Treatment Goals:  1) Safety: Client will notify staff when needing assistance to develop or implement a coping plan to manage suicidal or self injurious urges.Client will use coping plan for safety, as needed.   2) Symptom Management: Mackenzie will learn 1-2 ways to depersonalize emotional anger from others in a healthy way. Continue to practice mindfulness and find 1-2 ways to practice this skill.    3) In life skills Mackenzie will:     learn, practice, generalize 2 skills/strategies for improved lifestyle balance focusing on self compassion and self care.    learn, practice, generalize sensory and mindfulness based self regulation skills/strategies to support improved participation in important life roles and relationships.  3) Discharge Preparation: Mackenzie will:     Will develop Aftercare Planning.     Will learn ways to expand support.         Area of Treatment Focus:  Symptom Management, Personal Safety and Develop / Improve Independent Living Skills    Therapeutic Interventions/Treatment Strategies:  Support, Feedback, Safety Assessments, Structured Activity, Education and weighted materials and sensory modalities    Response to Treatment Strategies:  Accepted Feedback, Listened, Accepted Support, Alert and Distracted    Name of Group: Life Skills: goal integration  Time: 2:00-2:50     Description and Outcome: Mackenzie attended and participated in a structured life skills group where intervention focuses on learning, developing, and practicing evidence based skills and strategies through structured experiential psycho-education, or structured focused activity, designed to foster self-compassion and improve function in valued roles, routines, relationships, and promote independent living skills.      Topic " today is goal integration (identify meaningful ways to apply learned skills and insight into their every day life). Psychoeducation and discussion around the concepts of values based goal setting rooted in self-compassion was provided. This was followed by definition and examples of SMART goals (specific, measurable, achievable, relevant, time bound).       Group members were led through structured process of   1) reviewing their treatment and/or SMART goals from last week  2) reflect on the positives or successes they experienced in the past week.   3) Identify 3 functional areas they want to focus on this week,   4) write a SMART goal for each of those.   5) schedule their SMART goals into weekly planner and address any barriers with groupMario Mann reflects on her progress and positive things that have happened for her: she made it to the program, her children had fun this weekend, she was effective in her role as mom, and she delighted watching her kids have fun.       Mackenzie focuses her goals around: Self-compassion: buy a plant to remember her daughter, Connections: have a meaningful conversation with her  about specific things he can do to help her. Start a planner to help her get organized.      She verbalized understanding of session content by reporting positive steps she is taking towards recovery and also identified meaningful SMART goals for the coming week.      Is this a Weekly Review of the Progress on the Treatment Plan?  No

## 2018-05-30 NOTE — PROGRESS NOTES
[facility]   Dr. Kelsey's Psychiatric Progress Note  2018      Patient:  Mackenzie Garnica   Medical Record Number:  [id number]  :  1980          Interim History:   The patient's care was discussed with the treatment team and chart notes were reviewed.  Long weekend went ok.  Took kids to [place] on Monday for creole experience.  She got through it.  Lots of people at mall.  Mood has been rough.  Bad yesterday.  Better today but came after 11 AM.  No crying x 2 days.  Fights a lot in dreams or plods through high snow or dreams about being in hospital and baby dying.   Groups are going better.      Psychiatric ROS:  Mood:   Depressed but up and down    Sleep:    Waking up a lot in night;  Vivid nightmares wake her;  Initial insomnia;   Appetite:  low  Eating:  Makes self eat;    Energy Level:  LOW  Concentration/Memory Problems:  Yes  Suicidal Thoughts:  Yes, fleeting;  No plan or intent;  Worse when alone  Homicidal Thoughts:No  Psychotic Symptoms: No  Medication Side Effects:No  Medication Compliance:Yes   Physical Complaints:negative         Medications:     PAST MEDS:  Zoloft and Ativan    Current Outpatient Prescriptions   Medication Sig     LORazepam (ATIVAN PO) Take by mouth daily as needed for anxiety     sertraline (ZOLOFT) 100 MG tablet Take 1 tablet (100 mg) by mouth daily           No current facility-administered medications for this encounter.              Allergies:   No Known Allergies         Psychiatric Examination:   There were no vitals taken for this visit.  Weight is 0 lbs 0 oz  There is no height or weight on file to calculate BMI.    Appearance:  awake, alert and adequately groomed  Attitude:  cooperative  Eye Contact:  fair  Mood:  depressed  Affect:  mood congruent  Speech:  clear, coherent  Psychomotor Behavior:  no evidence of tardive dyskinesia, dystonia, or tics  Throught Process:  logical  Associations:  no loose associations  Thought  Content:  no evidence of psychotic thought and intermittent suicidal thoughts without plan or intent  Insight:  fair  Judgement:  fair  Oriented to:  time, person, and place  Attention Span and Concentration:  intact  Recent and Remote Memory:  intact  Gait:Normal    Risk/Potential for Dangerousness:  Multiple Active Diagnoses:HIGH  Self Care:HIGH  Suicide:MODERATE  Assault:LOW  Self Injurious Behaviors:LOW  Inappropriate Sexual Behavior:LOW         Labs:   No results found for this or any previous visit (from the past 24 hour(s)).     No results found for this or any previous visit (from the past 1008 hour(s)).      Impression:   This is a 37 year old female continues PHP for mood stabilization.   Mood is depressed.  She drank Monday 4 drinks but not Saturday, Sunday or Tuesday.   She's bothered by nightmares.           DIagnoses:     Axis I:     1.  Major depressive disorder.   2.  Generalized anxiety disorder.     3.  Panic disorder without agoraphobia.   4.  Posttraumatic stress disorder.   5.  Alcohol use disorder.   Axis II:  No diagnosis   Axis III:  Obesity.            Plan:     Continue [facility] Hospital Program.   Increased Zoloft from 50 mg daily to 100 mg daily.   We will reevaluate psychotropic medications during her partial hospital stay.   Expect stabilization and completion of Partial Hospital Program.   Ms. Garnica gets medication management from her primary care physician, Dr. Zeyad Scherer at [facility] and sees therapist, Haleigh Diaz in [place].     David Kelsey MD

## 2018-05-31 ENCOUNTER — HOSPITAL ENCOUNTER (OUTPATIENT)
Dept: BEHAVIORAL HEALTH | Facility: CLINIC | Age: 38
End: 2018-05-31
Attending: PSYCHIATRY & NEUROLOGY
Payer: COMMERCIAL

## 2018-05-31 ENCOUNTER — TELEPHONE (OUTPATIENT)
Dept: BEHAVIORAL HEALTH | Facility: CLINIC | Age: 38
End: 2018-05-31

## 2018-05-31 PROCEDURE — H0035 MH PARTIAL HOSP TX UNDER 24H: HCPCS

## 2018-05-31 NOTE — PROGRESS NOTES
"Adult Mental Health Partial Hospitalization Group Therapy Progress Note     Date: 5/30/18    Client Initial Individualized Goals for Treatment: \"I would like to be able to manage my depression and anxiety\".    Treatment Goals:  1) Safety: Client will notify staff when needing assistance to develop or implement a coping plan to manage suicidal or self injurious urges.Client will use coping plan for safety, as needed.   2) Symptom Management: Mackenzie will learn 1-2 ways to depersonalize emotional anger from others in a healthy way. Continue to practice mindfulness and find 1-2 ways to practice this skill.    3) In life skills Mackenzie will:     learn, practice, generalize 2 skills/strategies for improved lifestyle balance focusing on self compassion and self care.    learn, practice, generalize sensory and mindfulness based self regulation skills/strategies to support improved participation in important life roles and relationships.  3) Discharge Preparation: Mackenzie will:     Will develop Aftercare Planning.     Will learn ways to expand support.         Area of Treatment Focus:  Symptom Management, Personal Safety and Develop / Improve Independent Living Skills    Therapeutic Interventions/Treatment Strategies:  Support, Feedback, Safety Assessments, Structured Activity, Education and weighted materials and sensory modalities    Response to Treatment Strategies:  Accepted Feedback, Listened, Accepted Support, Alert and Distracted    Name of Group: Life Skills: OT clinic  Time: 1:00-1:50     Description and Outcome: Mackenzie attended and participated in a structured life skills group where intervention focuses on learning, developing, and practicing evidence based skills and strategies through structured experiential psycho-education, or structured focused activity, designed to foster self-compassion and improve function in valued roles, routines, relationships, and promote independent living skills.      Mackenzie" presents with a calm even affect and initiates all steps of her chosen creative expression activity. She works with good focus and skill. She reports finding the activity very calming and soothing. Validation and support provided around ways to integrate the activity into her weekly life. She struggles with putting her needs first. Mackenzie would benefit from additional opportunities to practice the content to be able to generalize it to her everyday life with increased intentionality, consistency, and efficacy in support of her psychiatric recovery.     Is this a Weekly Review of the Progress on the Treatment Plan?  Yes.      Are Treatment Plan Goals being addressed?  Yes, continue treatment goals      Are Treatment Plan Strategies to Address Goals Effective?  Yes, continue treatment strategies      Are there any current contracts in place?  No

## 2018-05-31 NOTE — PROGRESS NOTES
"Adult Mental Health Outpatient Group Therapy Progress Note   Client Initial Individualized Goals for Treatment: I would like to be able to manage my depression and anxiety\".       Client will notify staff when needing assistance to develop or implement a coping plan to manage suicidal or self injurious urges.  Client will use coping plan for safety, as needed.  Learn 1-2 ways to depersonalize emotional anger from others in a healthy way. Continue to practice mindfulness and find 1-2 ways to practice this skill.    1) learn, practice, generalize 2 skills/strategies for improved lifestyle balance focusing on self compassion and self care.  2) learn, practice, generalize sensory and mindfulness based self regulation skills/strategies to support improved participation in important life roles and relationships.  Will develop Aftercare Planning.   Will learn ways to expand support.   Area of Treatment Focus:  Symptom Management and Develop Socialization / Interpersonal Relationship Skills    Therapeutic Interventions/Treatment Strategies:  Support, Feedback, Structured Activity, Problem Solving and Education    Response to Treatment Strategies:  Accepted Feedback, Gave Feedback, Listened, Focused on Goals, Attentive, Accepted Support and Alert    Name of Group:  .self talk [id number]     Description and Outcome:  Self Talk  Client participated in an education session on self-validation.  Writer discussed the effect of heightened emotions on self-talk and the increased tendency to distort thinking.  Writer presented the 3 components of self-compassion and related how cognitive distortions affect each component.  Client related experiences of distorted thinking and group members.  Self-validation was presented as a way to decrease emotions and increase clarity in thought.  Validation was defined and steps to practice for increased self-validation were presented. Client discussed problems with othersr in her life not " responding to changes she is making.  Writer clarified the changes are for her, others may not accept a new way to communicate which will bring about others decisions about the relationship and how to interact with people who do not want to let her change.  No SI reported.   Client reported understanding the information presented and willingness to try the skills.    Is this a Weekly Review of the Progress on the Treatment Plan?  No

## 2018-05-31 NOTE — PROGRESS NOTES
"Adult Mental Health Outpatient Group Therapy Progress Note     Client Initial Individualized Goals for Treatment: \"I would like to be able to manage my depression and anxiety\".         See Initial Treatment suggestions for the client during the time between Diagnostic Assessment and completion of the Master Individualized Treatment Plan    Follow Safety Plan  Abstain from Substance Use   Ask for more information, support and/or assistance as needed.  Follow up with providers/community supports as needed: therapist, PCP  Report increases or changes in symptoms to staff.  Report any personal safety concerns to staff.   Take medications as prescribed.  Report medication changes and/or side effects to staff.  Attend and participate in groups as scheduled or notify staff if unable to do so.  Report any use of substances to staff as this may impact your symptoms and/or personal safety.  Notify staff if you have any other issues that need to be addressed. This may include any current abuse / neglect / exploitation or other vulnerability.  Follow recommendations of your treatment team and discuss concerns if not in agreement.     Area of Treatment Focus:  Symptom Management, Personal Safety and Cultural / Spirituality    Therapeutic Interventions/Treatment Strategies:  Support, Feedback, Safety Assessments, Clarification, Education and validation    Response to Treatment Strategies:  Accepted Feedback, Gave Feedback, Listened, Accepted Support and Alert    Name of Group:  Loss and Grief [id number]     Description and Outcome:  Group was lead by chaplain Soriano. Poems and discussion were used to facilitate the conversation of grief and loss. The group was encouraged to share and process their feelings of loss with a grief diagram. Themes discussed: recognizing grief as a feeling, accepting grief as our own, trusting grief to lead us to hope and remembering to show compassion to ourselves when we face grief.      Mackenzie shared " her experience with grief and loss with the group over the past few years.  She was tearful at times but reported it was helpful to share her experience.  Would like to find a way to discuss grief and loss with her family but she finds this difficult.  Mackenzie discussed how she believes her loss is contributing to her depression currently.  Client verbalized understanding of session content by sharing her experience with grief and loss and receiving support and validation around this.  Client would benefit from additional opportunities to practice and implement content from this session.    Is this a Weekly Review of the Progress on the Treatment Plan?  No

## 2018-05-31 NOTE — PROGRESS NOTES
"Adult Mental Health Outpatient Group Therapy Progress Note     Client Initial Individualized Goals for Treatment: \"I would like to be able to manage my depression and anxiety\".     Treatment Goals:  1) Safety: Client will notify staff when needing assistance to develop or implement a coping plan to manage suicidal or self injurious urges.Client will use coping plan for safety, as needed.   2) Symptom Management: Mackenzie will learn 1-2 ways to depersonalize emotional anger from others in a healthy way. Continue to practice mindfulness and find 1-2 ways to practice this skill.    3) In life skills Mackenzie will:     learn, practice, generalize 2 skills/strategies for improved lifestyle balance focusing on self compassion and self care.    learn, practice, generalize sensory and mindfulness based self regulation skills/strategies to support improved participation in important life roles and relationships.  3) Discharge Preparation: Mackenzie will:     Will develop Aftercare Planning.     Will learn ways to expand support.      Area of Treatment Focus:  Symptom Management, Personal Safety and Develop / Improve Independent Living Skills    Therapeutic Interventions/Treatment Strategies:  Support, Feedback, Safety Assessments, Structured Activity, Education and weighted materials and sensory modalities    Response to Treatment Strategies:  Listened, Accepted Support, Alert and Distracted    Name of Group:  [facility] [id number]     Description and Outcome:  Mackenzie arrived about 15 minutes late to group.  Reported she forgot her bag on the bus today and was upset with herself for having done that.  Mackenzie participated in a psycho-educational group focused on learning about our sensory system and how sensory input can be helpful in managing symptoms and stress.  She was quiet in group today.  Appeared distracted and had difficulty focusing on the topic.  Affect was constricted.  Client would benefit from additional " opportunities to practice and implement content from this session.    Is this a Weekly Review of the Progress on the Treatment Plan?  No

## 2018-06-01 NOTE — PROGRESS NOTES
"  Adult Mental Health Outpatient Group Therapy Progress Note     Client Initial Individualized Goals for Treatment: I would like to be able to manage my depression and anxiety\".      Client will notify staff when needing assistance to develop or implement a coping plan to manage suicidal or self injurious urges.  Client will use coping plan for safety, as needed.  Learn 1-2 ways to depersonalize emotional anger from others in a healthy way. Continue to practice mindfulness and find 1-2 ways to practice this skill.    1) learn, practice, generalize 2 skills/strategies for improved lifestyle balance focusing on self compassion and self care.  2) learn, practice, generalize sensory and mindfulness based self regulation skills/strategies to support improved participation in important life roles and relationships.  Will develop Aftercare Planning.   Will learn ways to expand support.     Area of Treatment Focus:  Symptom Management and Develop Socialization / Interpersonal Relationship Skills    Therapeutic Interventions/Treatment Strategies:  Support, Feedback, Structured Activity, Clarification and Education    Response to Treatment Strategies:  Accepted Feedback, Gave Feedback, Listened, Focused on Goals, Attentive, Accepted Support and Alert    Name of Group:  Mental Health Management, [id number], Self Awareness [id number]     Description and Outcome:  Mental Health Management: Information presented on authenticity and perfectionism.  Authenticity was presented as self validating, self awareness, in contrast to perfectionism which is self invalidating.  Self talk/cognitive distortions were introduced as contributing to perfectionism.  The group was given handouts outlining distortions and providing instruction for refuting the distortions.  Mackenzie verbalized understanding of session by identifying cognitive distortions that she uses that contribute to perfectionism.    Self Awareness:The group was provided with a " guided breathing and warmup structure with focus on increasing self awareness and on providing an embodied experience.  Discussion included the importance of listening to body cues as a way of identifying emotion as well as accompanying needs and wants, and as a way of practising self compassion, authenticity, mindfulness, self expression,  and connection to other.  The group focused on balance, taking risks with going off center to develop the skills to regain balance. Mackenzie demonstrated understanding of session by engaging in experiential.  She verbalizes being able to be mindful as she was moving.    Is this a Weekly Review of the Progress on the Treatment Plan?  No

## 2018-06-05 ENCOUNTER — HOSPITAL ENCOUNTER (OUTPATIENT)
Dept: BEHAVIORAL HEALTH | Facility: CLINIC | Age: 38
End: 2018-06-05
Attending: PSYCHIATRY & NEUROLOGY
Payer: COMMERCIAL

## 2018-06-05 PROCEDURE — H0035 MH PARTIAL HOSP TX UNDER 24H: HCPCS

## 2018-06-05 NOTE — PROGRESS NOTES
Adult Mental Health Intensive Outpatient Discharge Summary/Instructions      Patient: Mackenzie Garnica MRN: [id number]   : 1980 Age: 37 year old Sex: female     Admission Date: 18  Discharge Date: 2018    Diagnosis: Major depressive disorder.   2.  Generalized anxiety disorder.     3.  Panic disorder without agoraphobia.   4.  Posttraumatic stress disorder.   5.  Alcohol use disorder.     Focus of Treatment / Progress    Personal Safety: history of thoughts of suicide. If such thoughts occur again, increase active use of coping strategies and talk to support people     * Follow your safety plan     * Call crisis lines as needed:    [facility] [phone] [facility] [phone]  [facility] [phone] Crisis Connection [phone]  [facility] [phone] [facility] [phone]  [facility] [phone] National Suicide Prevention [phone]  [facility] [phone] Suicide Prevention [phone]  [facility] [phone]    Managing symptoms of:  Depressed mood, feeling of helplessness, irritability, fatigue, low interest in activities, low self-confidence, feeling dizzy, difficulty breathing, feeling on guard, panic attacks, thoughts that it would be better not to be alive, thoughts of suicide, thoughts about smashing things, slow thoughts, rumination, worry, forgetfulness, difficulty concentrating, poor memory, increased appetite and weight gain, sleeping too much, difficulty falling and staying asleep, not restful sleep, low motivation / energy, staying busy to distract self, crying easily, avoiding places due to fear, increased alcohol use, grief that interacts with depression      Community support/health:  [facility], [facility], [facility]    Managing Symptoms and Preventing Relapse    * Go to all of your appointments    * Take all medications as directed.      * Carry a current list if medication with you    * Do not use illicit (street)  drugs.  Avoid alcohol    * Report these symptoms to your care team. These are early signs of relapse:   Thoughts of suicide   Losing more sleep   Increased confusion   Mood getting worse   Feeling more aggressive   Other:      *Use these skills daily:  Mindfulness, practice self-compassion, practice authenticity in making choices, maintain balance in schedule (time for self and others, time for relaxation and time for activities, time for leisure and time for tasks, time at home and time out of the house), assert needs and set limits with others as needed, practice intentional physical relaxation, challenge negative thoughts/use affirming and encouraging self-talk, engage with support people on regular basis, practice good self-care (sleep hygiene, balanced eating, regular rest, take care of medical needs, maintain personal hygiene, self-nurturing activities), acknowledge and accept your feelings    Copy of summary sent to:     Follow up with psychiatrist / main caregiver: Dr. Scherer   Next visit: tbd    Follow up with your therapist: Haleigh Diaz    Next visit:     Go to group therapy and / or support groups at: consider support groups at [facility]. [facility] treatment/[facility] [phone] T, W, F 9-12. Start Friday June 8.    See your medical doctor about:  Assessment for sleep apnea    Other:   We appreciate the opportunity to work with you in the past few weeks and wish you the best as you move forward. We encourage you to continue attending to all of your health needs.    Client Signature:_______________________  Date / Time:___________  Staff Signature:________________________   Date / Time:___________

## 2018-06-06 NOTE — PROGRESS NOTES
"Adult Mental Health Partial Hospitalization Group Therapy Progress Note     Date: 5/31/18    Client Initial Individualized Goals for Treatment: \"I would like to be able to manage my depression and anxiety\".    Treatment Goals:  1) Safety: Client will notify staff when needing assistance to develop or implement a coping plan to manage suicidal or self injurious urges.Client will use coping plan for safety, as needed.   2) Symptom Management: Mackenzie will learn 1-2 ways to depersonalize emotional anger from others in a healthy way. Continue to practice mindfulness and find 1-2 ways to practice this skill.    3) In life skills Mackenzie will:     learn, practice, generalize 2 skills/strategies for improved lifestyle balance focusing on self compassion and self care.    learn, practice, generalize sensory and mindfulness based self regulation skills/strategies to support improved participation in important life roles and relationships.  3) Discharge Preparation: Mackenzie will:     Will develop Aftercare Planning.     Will learn ways to expand support.         Area of Treatment Focus:  Symptom Management, Personal Safety and Develop / Improve Independent Living Skills    Therapeutic Interventions/Treatment Strategies:  Support, Feedback, Safety Assessments, Structured Activity, Education and weighted materials and sensory modalities    Response to Treatment Strategies:  Accepted Feedback, Listened, Accepted Support, Alert and Distracted    Name of Group: Life Skills  Time: 1:00-1:50     Description and Outcome: Mackenzie attended and participated in a structured life skills group where intervention focuses on learning, developing, and practicing evidence based skills and strategies through structured experiential psycho-education, or structured focused activity, designed to foster self-compassion and improve function in valued roles, routines, relationships, and promote independent living skills.      Topic today is on " lifestyle balance. Psychoeducation on importance to identify individual needs and preferences to maintain a healthy lifestyle. Focus on leisure time and positive impact it has on activating the calming parasympathetic nervous system in support of mental health was provided. Group discussion followed by structured process where group members identify their leisure values, reflect on their past, present, and future leisure activities and how those fulfil their values. Mackenzie is engaged, reflects on how being creative and reading is important to her and was able to identify one small way to live more fully in that value in support of her psychiatric recovery.  Validation and support provided.Mackenzie would benefit from additional opportunities to practice the content to be able to generalize it to her everyday life with increased intentionality, consistency, and efficacy in support of her psychiatric recovery.     Is this a Weekly Review of the Progress on the Treatment Plan?  No.

## 2018-06-08 NOTE — PROGRESS NOTES
"Adult Mental Health Partial Hospitalization Group Therapy Progress Note     Date: 6/05/18    Client Initial Individualized Goals for Treatment: \"I would like to be able to manage my depression and anxiety\".    Treatment Goals:  1) Safety: Client will notify staff when needing assistance to develop or implement a coping plan to manage suicidal or self injurious urges.Client will use coping plan for safety, as needed.   2) Symptom Management: Mackenzie will learn 1-2 ways to depersonalize emotional anger from others in a healthy way. Continue to practice mindfulness and find 1-2 ways to practice this skill.    3) In life skills Mackenzie will:     learn, practice, generalize 2 skills/strategies for improved lifestyle balance focusing on self compassion and self care.    learn, practice, generalize sensory and mindfulness based self regulation skills/strategies to support improved participation in important life roles and relationships.  3) Discharge Preparation: Mackenzie will:     Will develop Aftercare Planning.     Will learn ways to expand support.         Area of Treatment Focus:  Symptom Management, Personal Safety and Develop / Improve Independent Living Skills    Therapeutic Interventions/Treatment Strategies:  Support, Feedback, Safety Assessments, Structured Activity, Education and weighted materials and sensory modalities    Response to Treatment Strategies:  Accepted Feedback, Listened, Accepted Support, Alert and Distracted    Name of Group: Life Skills  Time: 1:00-1:50     Description and Outcome: Mackenzie attended and participated in a structured life skills group where intervention focuses on learning, developing, and practicing evidence based skills and strategies through structured experiential psycho-education, or structured focused activity, designed to foster self-compassion and improve function in valued roles, routines, relationships, and promote independent living skills.       Topic today is focused " on group experiential activity that encouarages effective communication and enjoyment (activation of parasympathetic nervous system) to gain an increased sense of efficacy using taught skills in context. Mackenzie is fully engaged, shares she enjoyed the activity and reports improved mood and energy level today.  Validation and support provided. Mackenzie demonstrates understanding the content and able to identify ways to generalize it to her everyday life with increased intentionality, consistency, and efficacy in support of her psychiatric recovery. She reports readiness for discharge today.    Is this a Weekly Review of the Progress on the Treatment Plan?  No.

## 2018-06-11 NOTE — PROGRESS NOTES
"Adult Mental Health Outpatient Group Therapy Progress Note     Client Initial Individualized Goals for Treatment: \"I would like to be able to manage my depression and anxiety\".     Treatment Goals:  1) Safety: Client will notify staff when needing assistance to develop or implement a coping plan to manage suicidal or self injurious urges.Client will use coping plan for safety, as needed.   2) Symptom Management: Mackenzie will learn 1-2 ways to depersonalize emotional anger from others in a healthy way. Continue to practice mindfulness and find 1-2 ways to practice this skill.    3) In life skills Mackenzie will:     learn, practice, generalize 2 skills/strategies for improved lifestyle balance focusing on self compassion and self care.    learn, practice, generalize sensory and mindfulness based self regulation skills/strategies to support improved participation in important life roles and relationships.  3) Discharge Preparation: Mackenzie will:     Will develop Aftercare Planning.     Will learn ways to expand support.     Area of Treatment Focus:  Symptom Management, Personal Safety and Cultural / Spirituality    Therapeutic Interventions/Treatment Strategies:  Support, Feedback, Safety Assessments and Education    Response to Treatment Strategies:  Accepted Feedback, Gave Feedback, Listened, Focused on Goals, Attentive, Accepted Support, Alert and Demonstrates Behavior Change    Name of Group:  Loss & Grief     Description and Outcome:  Group was lead by  Myo-O. Poems and discussion were used to facilitate the conversation of grief and loss. The group was encouraged to share and process their feelings of loss with a grief diagram. Themes discussed: recognizing grief as a feeling, accepting grief as our own, trusting grief to lead us to hope and remembering to show compassion to ourselves when we face grief.    Mackenzie actively contributed to the discussion on what is grief and loss and how she has experienced " this in her life.  Mackenzie shared how she took suggestions from the previous Loss & Grief group and got a plant to tend to as a way to honor her daughter who .  Mackenzie discussed how she is recognizing to her own grief and loss now and is finding she is less angry with herself and others by doing so.  Reported her mood has also improved.  Mackenzie offered support to peers.  Tearful at times but said the tears were more out of frederick than sadness as she sees progress she is making related to managing the losses in her life. Client verbalized understanding of session content by sharing insight and progress she is making in acknowledging and managing her grief.  Client would benefit from additional opportunities to practice and implement content from this session.    Is this a Weekly Review of the Progress on the Treatment Plan?  No

## 2018-06-12 ENCOUNTER — HOSPITAL ENCOUNTER (OUTPATIENT)
Dept: BEHAVIORAL HEALTH | Facility: CLINIC | Age: 38
End: 2018-06-12
Attending: PSYCHIATRY & NEUROLOGY
Payer: COMMERCIAL

## 2018-06-12 PROBLEM — F32.9 MAJOR DEPRESSIVE DISORDER: Status: ACTIVE | Noted: 2018-06-12

## 2018-06-12 PROCEDURE — H2012 BEHAV HLTH DAY TREAT, PER HR: HCPCS

## 2018-06-12 ASSESSMENT — ANXIETY QUESTIONNAIRES
IF YOU CHECKED OFF ANY PROBLEMS ON THIS QUESTIONNAIRE, HOW DIFFICULT HAVE THESE PROBLEMS MADE IT FOR YOU TO DO YOUR WORK, TAKE CARE OF THINGS AT HOME, OR GET ALONG WITH OTHER PEOPLE: EXTREMELY DIFFICULT
2. NOT BEING ABLE TO STOP OR CONTROL WORRYING: SEVERAL DAYS
GAD7 TOTAL SCORE: 12
3. WORRYING TOO MUCH ABOUT DIFFERENT THINGS: NEARLY EVERY DAY
7. FEELING AFRAID AS IF SOMETHING AWFUL MIGHT HAPPEN: NOT AT ALL
6. BECOMING EASILY ANNOYED OR IRRITABLE: NEARLY EVERY DAY
5. BEING SO RESTLESS THAT IT IS HARD TO SIT STILL: SEVERAL DAYS
1. FEELING NERVOUS, ANXIOUS, OR ON EDGE: SEVERAL DAYS

## 2018-06-12 ASSESSMENT — PATIENT HEALTH QUESTIONNAIRE - PHQ9: 5. POOR APPETITE OR OVEREATING: NEARLY EVERY DAY

## 2018-06-12 NOTE — PROGRESS NOTES
"  Adult Mental Health Outpatient Group Therapy Progress Note     Client Initial Individualized Goals for Treatment: I would like to be able to manage my depression and anxiety\".      Client will notify staff when needing assistance to develop or implement a coping plan to manage suicidal or self injurious urges.  Client will use coping plan for safety, as needed.  Learn 1-2 ways to depersonalize emotional anger from others in a healthy way. Continue to practice mindfulness and find 1-2 ways to practice this skill.    1) learn, practice, generalize 2 skills/strategies for improved lifestyle balance focusing on self compassion and self care.  2) learn, practice, generalize sensory and mindfulness based self regulation skills/strategies to support improved participation in important life roles and relationships.  Will develop Aftercare Planning.   Will learn ways to expand support.     Area of Treatment Focus:  Symptom Management, Personal Safety and Develop Socialization / Interpersonal Relationship Skills    Therapeutic Interventions/Treatment Strategies:  Support, Feedback, Safety Assessments and Clarification    Response to Treatment Strategies:  Accepted Feedback, Gave Feedback, Listened, Focused on Goals, Attentive, Accepted Support and Alert    Name of Group:  Psychotherapy [id number]     Description and Outcome:  Mackenzie was oriented to group structure and introduced to group members on her first day.  She shared that she is transitioning from php and that she had found that program helpful.  States that she is finding mindfulness helpful and is wanting to learn more on communication.  She shared that she struggles with communication with  and feels guilty about asking for support.  He did not attend family group in [facility].  She also shared guilt and unworthiness following death of child.  Mackenzie demonstrated understanding of session with self disclosure and her engagement with others.  She would benefit from " further education, particularly on radical acceptance.  No safety concerns.    Is this a Weekly Review of the Progress on the Treatment Plan?  No

## 2018-06-12 NOTE — PROGRESS NOTES
"Adult Mental Health Outpatient Group Therapy Progress Note     Client Initial Individualized Goals for Treatment:  I would like to be able to manage my depression and anxiety\".    See Initial Treatment suggestions for the client during the time between Diagnostic Assessment and completion of the Master Individualized Treatment Plan.    Treatment Goals:  Client will notify staff when needing assistance to develop or implement a coping plan to manage suicidal or self injurious urges.  Client will use coping plan for safety, as needed.  Learn 1-2 ways to depersonalize emotional anger from others in a healthy way. Continue to practice mindfulness and find 1-2 ways to practice this skill.    1) learn, practice, generalize 2 skills/strategies for improved lifestyle balance focusing on self compassion and self care.  2) learn, practice, generalize sensory and mindfulness based self regulation skills/strategies to support improved participation in important life roles and relationships.  Will develop Aftercare Planning.   Will learn ways to expand support.     Area of Treatment Focus:  Symptom Management    Therapeutic Interventions/Treatment Strategies:  Support, Feedback, Safety Assessments, Structured Activity and Education    Response to Treatment Strategies:  Accepted Feedback, Listened, Attentive, Accepted Support and Alert    Name of Group:  Life  Skills(9:00-9:50)     Description and Outcome:  Client presented with calm,even mood during first session in this treatment program.Good focus and concentration during a discussion related to stress management and resiliency skills. Initial treatment goals identified by client includes improving time management,self esteem and stress management.  Client would benefit from additional opportunities to practice and implement content from this session  in every day life,relationships and mental health recovery.    Is this a Weekly Review of the Progress on the Treatment " Plan?  Yes.      Are Treatment Plan Goals being addressed?  Yes, continue treatment goals      Are Treatment Plan Strategies to Address Goals Effective?  Yes, continue treatment strategies      Are there any current contracts in place?  No

## 2018-06-13 ASSESSMENT — ANXIETY QUESTIONNAIRES: GAD7 TOTAL SCORE: 12

## 2018-06-13 ASSESSMENT — PATIENT HEALTH QUESTIONNAIRE - PHQ9: SUM OF ALL RESPONSES TO PHQ QUESTIONS 1-9: 14

## 2018-06-15 ENCOUNTER — HOSPITAL ENCOUNTER (OUTPATIENT)
Dept: BEHAVIORAL HEALTH | Facility: CLINIC | Age: 38
End: 2018-06-15
Attending: PSYCHIATRY & NEUROLOGY
Payer: COMMERCIAL

## 2018-06-15 PROCEDURE — H2012 BEHAV HLTH DAY TREAT, PER HR: HCPCS

## 2018-06-15 NOTE — PROGRESS NOTES
"Adult Mental Health Outpatient Group Therapy Progress Note     Client Initial Individualized Goals for Treatment:  I would like to be able to manage my depression and anxiety\".    See Initial Treatment suggestions for the client during the time between Diagnostic Assessment and completion of the Master Individualized Treatment Plan.    Treatment Goals:  Client will notify staff when needing assistance to develop or implement a coping plan to manage suicidal or self injurious urges.  Client will use coping plan for safety, as needed.  Learn 1-2 ways to depersonalize emotional anger from others in a healthy way. Continue to practice mindfulness and find 1-2 ways to practice this skill.    1) learn, practice, generalize 2 skills/strategies for improved lifestyle balance focusing on self compassion and self care.  2) learn, practice, generalize sensory and mindfulness based self regulation skills/strategies to support improved participation in important life roles and relationships.  Will develop Aftercare Planning.   Will learn ways to expand support.     Area of Treatment Focus:  Symptom Management    Therapeutic Interventions/Treatment Strategies:  Support, Feedback, Safety Assessments, Structured Activity and Education    Response to Treatment Strategies:  Accepted Feedback, Listened, Attentive, Accepted Support and Alert    Name of Group:  Life  Skills(9:00-9:50)     Description and Outcome:  Client presented with calm,even mood .Poor to fair focus and concentration during a psychoeducation discussion related to self esteem and strategies for self improvement. She appeared to be multitasking as she was organizing some of her personal work while also trying to listen to the discussion.  Client would benefit from additional opportunities to practice and implement content from this session  in every day life,relationships and mental health recovery.    Is this a Weekly Review of the Progress on the Treatment " Plan?  Yes.      Are Treatment Plan Goals being addressed?  Yes, continue treatment goals      Are Treatment Plan Strategies to Address Goals Effective?  Yes, continue treatment strategies      Are there any current contracts in place?  No

## 2018-06-15 NOTE — PROGRESS NOTES
"  Adult Mental Health Outpatient Group Therapy Progress Note     Client Initial Individualized Goals for Treatment: I would like to be able to manage my depression and anxiety\".      Client will notify staff when needing assistance to develop or implement a coping plan to manage suicidal or self injurious urges.  Client will use coping plan for safety, as needed.  Learn 1-2 ways to depersonalize emotional anger from others in a healthy way. Continue to practice mindfulness and find 1-2 ways to practice this skill.    1) learn, practice, generalize 2 skills/strategies for improved lifestyle balance focusing on self compassion and self care.  2) learn, practice, generalize sensory and mindfulness based self regulation skills/strategies to support improved participation in important life roles and relationships.  Will develop Aftercare Planning.   Will learn ways to expand support.     Area of Treatment Focus:  Symptom Management, Personal Safety and Develop Socialization / Interpersonal Relationship Skills    Therapeutic Interventions/Treatment Strategies:  Support, Feedback, Safety Assessments and Clarification    Response to Treatment Strategies:  Accepted Feedback, Gave Feedback, Listened, Focused on Goals, Attentive, Accepted Support and Alert    Name of Group:  Psychotherapy [id number]     Description and Outcome:  Mackenzie related to peer who was describing racing thoughts and difficulty focusing on the present moment.  Mackenzie states this is difficult for her.  This is demonstrated by her confusion and ambivalence about being in this program vs. Exploring alternative iop which is 4 days per week.  Mackenzie accepted the feedback that she needs to focus on being where she is at, and the observation that she has had difficulty in managing schedule as it is.  Reviewed possibility of DBT as part of discharge plan, when that time should come.  Mackenzie demonstrated understanding of session by engaging in brief mindfulness " exercise and being effective in grounding self.  No safety concerns.    Is this a Weekly Review of the Progress on the Treatment Plan?  Yes.      Are Treatment Plan Goals being addressed?  Yes, continue treatment goals      Are Treatment Plan Strategies to Address Goals Effective?  Yes, continue treatment strategies      Are there any current contracts in place?  No

## 2018-06-19 ENCOUNTER — HOSPITAL ENCOUNTER (OUTPATIENT)
Dept: BEHAVIORAL HEALTH | Facility: CLINIC | Age: 38
End: 2018-06-19
Attending: PSYCHIATRY & NEUROLOGY
Payer: COMMERCIAL

## 2018-06-19 PROCEDURE — H2012 BEHAV HLTH DAY TREAT, PER HR: HCPCS

## 2018-06-19 NOTE — PROGRESS NOTES
"Adult Mental Health Outpatient Group Therapy Progress Note     Client Initial Individualized Goals for Treatment:  I would like to be able to manage my depression and anxiety\".    See Initial Treatment suggestions for the client during the time between Diagnostic Assessment and completion of the Master Individualized Treatment Plan.    Treatment Goals:  Client will notify staff when needing assistance to develop or implement a coping plan to manage suicidal or self injurious urges.  Client will use coping plan for safety, as needed.  Learn 1-2 ways to depersonalize emotional anger from others in a healthy way. Continue to practice mindfulness and find 1-2 ways to practice this skill.    1) learn, practice, generalize 2 skills/strategies for improved lifestyle balance focusing on self compassion and self care.  2) learn, practice, generalize sensory and mindfulness based self regulation skills/strategies to support improved participation in important life roles and relationships.  Will develop Aftercare Planning.   Will learn ways to expand support.     Area of Treatment Focus:  Symptom Management    Therapeutic Interventions/Treatment Strategies:  Support, Feedback, Safety Assessments, Structured Activity and Education    Response to Treatment Strategies:  Accepted Feedback, Listened, Attentive, Accepted Support and Alert    Name of Group:  Life  Skills(9:00-9:50)     Description and Outcome:  Client presented as calm after arriving 20 minutes late. Fair focus and concentration during a psychoeducation discussion related to stress management and coping skills.Client was observed to be distracted by her phone during the session.  Client would benefit from additional opportunities to practice and implement content from this session  in every day life,relationships and mental health recovery.    Is this a Weekly Review of the Progress on the Treatment Plan?  Yes.      Are Treatment Plan Goals being addressed?  Yes, " continue treatment goals      Are Treatment Plan Strategies to Address Goals Effective?  Yes, continue treatment strategies      Are there any current contracts in place?  No

## 2018-06-19 NOTE — PROGRESS NOTES
"  Adult Mental Health Outpatient Group Therapy Progress Note     Client Initial Individualized Goals for Treatment: I would like to be able to manage my depression and anxiety\".      Client will notify staff when needing assistance to develop or implement a coping plan to manage suicidal or self injurious urges.  Client will use coping plan for safety, as needed.  Learn 1-2 ways to depersonalize emotional anger from others in a healthy way. Continue to practice mindfulness and find 1-2 ways to practice this skill.    1) learn, practice, generalize 2 skills/strategies for improved lifestyle balance focusing on self compassion and self care.  2) learn, practice, generalize sensory and mindfulness based self regulation skills/strategies to support improved participation in important life roles and relationships.  Will develop Aftercare Planning.   Will learn ways to expand support.       Area of Treatment Focus:  Symptom Management, Personal Safety and Develop Socialization / Interpersonal Relationship Skills    Therapeutic Interventions/Treatment Strategies:  Support, Feedback, Structured Activity, Clarification and Education    Response to Treatment Strategies:  Accepted Feedback, Gave Feedback, Listened, Focused on Goals, Attentive, Accepted Support and Alert    Name of Group:  Psychotherapy [id number]     Description and Outcome:  Mackenzie shared that she attended [facility] this week and regrets having done so.  Felt overwhelmed and found self  Comparing herself to others.  Mackenzie was proud of beginning some poetry and then sharing it with .  Mackenzie shared that she also enjoys singing.  Reviewed skill of opposite action, encouraging Mackenzie to sing even though she may not feel like it or enjoy it.  Mackenzie states she does sing to her children.  Mackenzie verbalized understanding of session by acknowledging importance of taking small steps in recovery.  No safety concerns.    Is this a Weekly Review of the " Progress on the Treatment Plan?  No

## 2018-06-21 NOTE — ADDENDUM NOTE
Encounter addended by: Sabine Watts, OT on: 6/21/2018  7:19 AM<BR>     Actions taken: Flowsheet accepted, Sign clinical note

## 2018-06-21 NOTE — PROGRESS NOTES
"  Adult Mental Health Outpatient Group Therapy Progress Note     Client Initial Individualized Goals for Treatment: I would like to be able to manage my depression and anxiety\".     Goals from PHP:     Client will notify staff when needing assistance to develop or implement a coping plan to manage suicidal or self injurious urges.  Client will use coping plan for safety, as needed.  Learn 1-2 ways to depersonalize emotional anger from others in a healthy way. Continue to practice mindfulness and find 1-2 ways to practice this skill.    1) learn, practice, generalize 2 skills/strategies for improved lifestyle balance focusing on self compassion and self care.  2) learn, practice, generalize sensory and mindfulness based self regulation skills/strategies to support improved participation in important life roles and relationships.  Will develop Aftercare Planning.   Will learn ways to expand support.     Area of Treatment Focus:  Symptom Management, Develop / Improve Independent Living Skills and Develop Socialization / Interpersonal Relationship Skills    Therapeutic Interventions/Treatment Strategies:  Support, Feedback, Safety Assessments, Structured Activity and Problem Solving    Response to Treatment Strategies:  Accepted Feedback, Gave Feedback, Listened, Focused on Goals, Attentive and Accepted Support    Name of Group:  [facility] [id number]     Description and Outcome:  Client attended and participated in a structured life skills psychoeducation group where intervention focused on experiential learning, practice, and generalization of skills through the use of supported social interaction, structured therapeutic tasks, and engagement in functional tasks in order to improve function in valued roles, routines, and independent living skills. Client had a bright affect in session. Provided client with verbal and experiential psychoeducation material focused on communication skills and self esteem in order to promote " participation in valued roles, routines, and relationships. Client demonstrated understanding of session content by taking healthy social risk in session. Client addressed ITP goal number 1 in this session.    Is this a Weekly Review of the Progress on the Treatment Plan?  No

## 2018-06-22 NOTE — PROGRESS NOTES
"Adult Mental Health Outpatient Group Therapy Progress Note     Date: 6/19/18    Client Initial Individualized Goals for Treatment:  I would like to be able to manage my depression and anxiety\".    Treatment Goals:  Client will notify staff when needing assistance to develop or implement a coping plan to manage suicidal or self injurious urges.  Client will use coping plan for safety, as needed.  Learn 1-2 ways to depersonalize emotional anger from others in a healthy way. Continue to practice mindfulness and find 1-2 ways to practice this skill.    1) learn, practice, generalize 2 skills/strategies for improved lifestyle balance focusing on self compassion and self care.  2) learn, practice, generalize sensory and mindfulness based self regulation skills/strategies to support improved participation in important life roles and relationships.  Will develop Aftercare Planning.   Will learn ways to expand support.     Area of Treatment Focus:  Symptom Management    Therapeutic Interventions/Treatment Strategies:  Support, Feedback, Safety Assessments, Structured Activity and Education    Response to Treatment Strategies:  Accepted Feedback, Listened, Attentive, Accepted Support and Alert    Name of Group: Wellness (11:00-11:50)     Description and Outcome:  Client presented as calm and even mood. Good focus and concentration during a psychoeducation and discussion related to values based time management and prioritizing. Client would benefit from additional opportunities to practice and implement content from this session  in every day life,relationships and mental health recovery.    Is this a Weekly Review of the Progress on the Treatment Plan?  No.          "

## 2018-06-26 ENCOUNTER — HOSPITAL ENCOUNTER (OUTPATIENT)
Dept: BEHAVIORAL HEALTH | Facility: CLINIC | Age: 38
End: 2018-06-26
Attending: PSYCHIATRY & NEUROLOGY
Payer: COMMERCIAL

## 2018-06-26 PROCEDURE — H2012 BEHAV HLTH DAY TREAT, PER HR: HCPCS

## 2018-06-26 NOTE — PROGRESS NOTES
Individualized Treatment Plan     Date of Plan: 82    Name: Mackenzie Garnica MRN: [id number]    : 1980    Programs:  Day Treatment (DT)     Clinical Track (if applicable):  4A    DSM5 Diagnosis  296.33 (F33.2) Major Depressive Disorder, Recurrent Episode, Severe _  300.01 (F41.0) Panic Disorder  300.02 (F41.1) Generalized Anxiety Disorder    Team Members Contributing to Plan:  Haris Xie, Rhoda Costello and Mariposa Rehman    Client Strengths:  caring, creative, educated, empathetic, employed, good listener, intelligent, open to learning, open to suggestions / feedback, responsible parent, supportive, wants to learn, willing to ask questions and work history I believe in god.     Client Participation in Plan:  Contributed to goals and plan   Attended individual treatment plan meeting on 18  Agrees with plan   Received copy of treatment plan   Discussed with staff   Treatment plan reviewed with client on 18    Areas of Vulnerability:  Suicidal Ideation -   Anxiety with/without panic attacks  Active/history of addiction/substance abuse  Depressive symptoms  Trauma/Abuse/Neglect.     Long-Term Goals:  Knowledge about illness and management of symptoms   Maintenance of personal safety     Abuse Prevention Plan:  Safe, therapeutic environment   Safety coping plan as needed   Education regarding illness and skill development   Coordination with care providers     Discharge Criteria:  Satisfactory progress toward treatment goals   Improvement re: identified problems and symptoms   Ability to continue recovery at next level of service   Has a discharge plan in place   Has safety/coping plan in place      Tentative Discharge Date: 18    Areas of Treatment Focus            Area of Treatment Focus:   Personal Safety  Start Date:    18    Goal:  Target Date: 18, Status: Active  Client will notify staff when needing assistance to develop or implement a coping plan to manage suicidal  or self injurious urges.      Progress:   7/27 Client reports that she is safe and not experiencing any suicidal ideation.        Treatment Strategies:   Assess / reassess level of potential for harm to self or others  Engage in safety planning when indicated  Facilitate increased self awareness            Area of Treatment Focus:   Symptom Stabilization and Management  Start Date:    6/27/18    Goal:  Target Date: 7/25/18, 8/22 Status: Active  When in life skills group client will learn and practice 1-2 coping strategies to improve time management,improve self esteem and better management of stress providing an update of progress weekly.      Progress:   7/27 Client reports she is trying to apply strategies to manage her time more effectively/ trying to feel more positive/ client reports she is using coping skills to manage stress.        Treatment Strategies:   Facilitate increased self awareness  Provide education regarding time management,self esteem and stress management.            Area of Treatment Focus:   Symptom Stabilization and Management  Start Date:    6/27/18    Goal:  Target Date: 7/25/18, 8/22 Status: Active  In psychotherapy groups, Mackenzie will identify skills that she can utlize to manage depression and anxiety, 2x weekly, reporting effectiveness each time      Progress:   7/25/18 Mackenzie has been making progress in this area.  She continues to practise mindfulness, challenging negative thoughts and prioritizing self care.  Will continue goal for further development and to include relapse prevention.        Treatment Strategies:   facilitate self awareness  Teach coping skills          Area of Treatment Focus:   Community Resources / Support and Discharge Planning  Start Date:    6/27/18    Goal:  Target Date: 7/25/18,8/22 Status: Active  Mackenzie will set at least one weekly goal related to developing wellness behaviors to support symptom stability during treatment and after discharge.       Progress:     7/27 Working on increasing physical activity.She reports walking 3-4 times each week.    Treatment Strategies:   Assist clients in establishing / strengthening support network  Assist with discharge planning  Engage in safety planning when indicated  Facilitate increased self awareness  Teach adaptive coping skills and communication skills

## 2018-06-26 NOTE — PROGRESS NOTES
"  Adult Mental Health Outpatient Group Therapy Progress Note     Client Initial Individualized Goals for Treatment: I would like to be able to manage my depression and anxiety\".      Client will notify staff when needing assistance to develop or implement a coping plan to manage suicidal or self injurious urges.  Client will use coping plan for safety, as needed.  Learn 1-2 ways to depersonalize emotional anger from others in a healthy way. Continue to practice mindfulness and find 1-2 ways to practice this skill.    1) learn, practice, generalize 2 skills/strategies for improved lifestyle balance focusing on self compassion and self care.  2) learn, practice, generalize sensory and mindfulness based self regulation skills/strategies to support improved participation in important life roles and relationships.  Will develop Aftercare Planning.   Will learn ways to expand support.     Area of Treatment Focus:  Symptom Management, Personal Safety and Develop Socialization / Interpersonal Relationship Skills    Therapeutic Interventions/Treatment Strategies:  Support, Feedback, Safety Assessments and Clarification    Response to Treatment Strategies:  Accepted Feedback, Gave Feedback, Listened, Focused on Goals, Attentive, Accepted Support and Alert    Name of Group:  Psychotherapy [id number]    Description and Outcome:  Mackenzie arrived late today, tearful.  Identifies thoughts about not being a good enough parent for children.  Mackenzie goes on to report arguments from her  and states that he is calling her \"stupid\" and a \"bad parent.\"  Mackenzie demonstrated understanding of session by her ability to challenge thoughts regarding her parenting and worthiness.  She accepted idea that she is having negative thoughts does not make it so and that negative thinking is a symptom of depression. Encouraged to think about the information her  is giving her about his ability to meet her needs and be a support.  Mackenzie " thanked the others in group for their support.  No safety concerns.    Is this a Weekly Review of the Progress on the Treatment Plan?  No

## 2018-06-27 ENCOUNTER — HOSPITAL ENCOUNTER (OUTPATIENT)
Dept: BEHAVIORAL HEALTH | Facility: CLINIC | Age: 38
End: 2018-06-27
Attending: PSYCHIATRY & NEUROLOGY
Payer: COMMERCIAL

## 2018-06-27 PROCEDURE — H2012 BEHAV HLTH DAY TREAT, PER HR: HCPCS

## 2018-06-28 NOTE — PROGRESS NOTES
"Adult Mental Health Outpatient Group Therapy Progress Note     Date: 6/27/18    Client Initial Individualized Goals for Treatment:  I would like to be able to manage my depression and anxiety\".    Treatment Goals:  1. Client will notify staff when needing assistance to develop or implement a coping plan to manage suicidal or self injurious urges  2. When in life skills group client will learn and practice 1-2 coping strategies to improve time management,improve self esteem and better management of stress providing an update of progress weekly.  3. In psychotherapy groups, Mackenzie will identify skills that she can utlize to manage depression and anxiety, 2x weekly, reporting effectiveness each time  4. Mackenzie will set at least one weekly goal related to developing wellness behaviors to support symptom stability during treatment and after discharge.     Area of Treatment Focus:  Symptom Management    Therapeutic Interventions/Treatment Strategies:  Support, Feedback, Safety Assessments, Structured Activity and Education    Response to Treatment Strategies:  Accepted Feedback, Listened, Attentive, Accepted Support and Alert    Name of Group: Life skills (10:00-10:50)   Group member attendance: 2/4 (Merged with another group)    Description and Outcome: Mackenzie attended and participated in an experiential Psychoeducation group where rehabilitative evidence based intervention focuses on learning, developing through practice, and generalizing independent living skills. The purpose of this group is to stabilize and manage mental health symptoms and to improve participation and function in valued roles, routines, relationships.      Topic today is self regulation through focused activity. Psychoeducation, discussion, and experiential practice of self regulation while engaging in group structured activity that challenges cognition and decision making. Mackenzie is engaged and focused and actively shares her experience with " the group.  She receives validation from her peers.  She verbalizes finding the experience a welcome healthy distraction for her today. She would benefit from additional opportunities to practice the content to be able to generalize it to her everyday life with increased intentionality, consistency, and efficacy in support of her psychiatric recovery.  She worked on goal number 2 today      Is this a Weekly Review of the Progress on the Treatment Plan?  No.

## 2018-06-29 NOTE — ADDENDUM NOTE
Encounter addended by: Rhoda Costello RN on: 6/29/2018  4:23 PM<BR>     Actions taken: Flowsheet accepted

## 2018-07-03 ENCOUNTER — HOSPITAL ENCOUNTER (OUTPATIENT)
Dept: BEHAVIORAL HEALTH | Facility: CLINIC | Age: 38
End: 2018-07-03
Attending: PSYCHIATRY & NEUROLOGY
Payer: COMMERCIAL

## 2018-07-03 PROCEDURE — H2012 BEHAV HLTH DAY TREAT, PER HR: HCPCS

## 2018-07-03 NOTE — PROGRESS NOTES
"Adult Mental Health Outpatient Group Therapy Progress Note     Client Initial Individualized Goals for Treatment:  I would like to be able to manage my depression and anxiety\".    See Initial Treatment suggestions for the client during the time between Diagnostic Assessment and completion of the Master Individualized Treatment Plan.    Treatment Goals:  1. Client will notify staff when needing assistance to develop or implement a coping plan to manage suicidal or self injurious urges  2. When in life skills group client will learn and practice 1-2 coping strategies to improve time management,improve self esteem and better management of stress providing an update of progress weekly.  3. In psychotherapy groups, Mackenzie will identify skills that she can utlize to manage depression and anxiety, 2x weekly, reporting effectiveness each time  4. Mackenzie will set at least one weekly goal related to developing wellness behaviors to support symptom stability during treatment and after discharge.     Area of Treatment Focus:  Symptom Management    Therapeutic Interventions/Treatment Strategies:  Support, Feedback, Safety Assessments, Structured Activity and Education    Response to Treatment Strategies:  Accepted Feedback, Listened, Attentive, Accepted Support and Alert    Name of Group:  Life Skills (11:00-11:50)     Group Participants: 4    Description and Outcome:  Mackenzie actively participated in Life Skills group on Coping Skills. She completed a worksheet on her \"Personal Coping Skills List\" and participated in a group discussion on coping skills, including the various types of coping skills (distraction, grounding, emotional release, self love, thought challenge, and accessing your higher self). She shared her list with the group, offering new ideas to peers, and learning skills from peers. She expressed verbal understanding of the concepts learned in the group. At the end of group, she identified one coping skill she " would use today as listening to music on her ride home.    Is this a Weekly Review of the Progress on the Treatment Plan?  No.

## 2018-07-03 NOTE — PROGRESS NOTES
"Adult Mental Health Outpatient Group Therapy Progress Note     Client Initial Individualized Goals for Treatment:  I would like to be able to manage my depression and anxiety\".    See Initial Treatment suggestions for the client during the time between Diagnostic Assessment and completion of the Master Individualized Treatment Plan.    Treatment Goals:  1. Client will notify staff when needing assistance to develop or implement a coping plan to manage suicidal or self injurious urges  2. When in life skills group client will learn and practice 1-2 coping strategies to improve time management,improve self esteem and better management of stress providing an update of progress weekly.  3. In psychotherapy groups, Mackenzie will identify skills that she can utlize to manage depression and anxiety, 2x weekly, reporting effectiveness each time  4. Mackenzie will set at least one weekly goal related to developing wellness behaviors to support symptom stability during treatment and after discharge.     Area of Treatment Focus:  Symptom Management    Therapeutic Interventions/Treatment Strategies:  Support, Feedback, Safety Assessments, Structured Activity and Education    Response to Treatment Strategies:  Accepted Feedback, Listened, Attentive, Accepted Support and Alert    Name of Group:  Life Skills (11:00-11:50)     Group Participants: 3 of 4 clients(unscheduled absence)    Description and Outcome:  Client arrived late but presented as calm and alert once settled in. Psychoeducation skills addressed included a discussion related to stress management and coping skills. Goal #1 (no personal safety concerns reported or observed). Goal #2 Addressed per discussion.  Client would benefit from additional opportunities to practice and implement content from this session  in every day life,relationships and mental health recovery.    Is this a Weekly Review of the Progress on the Treatment Plan?  Yes.      Are Treatment Plan Goals " being addressed?  Yes, continue treatment goals      Are Treatment Plan Strategies to Address Goals Effective?  Yes, continue treatment strategies      Are there any current contracts in place?  No

## 2018-07-03 NOTE — PROGRESS NOTES
"  Adult Mental Health Outpatient Group Therapy Progress Note     Client Initial Individualized Goals for Treatment: I would like to be able to manage my depression and anxiety\".      Client will notify staff when needing assistance to develop or implement a coping plan to manage suicidal or self injurious urges.  Client will use coping plan for safety, as needed.  Learn 1-2 ways to depersonalize emotional anger from others in a healthy way. Continue to practice mindfulness and find 1-2 ways to practice this skill.    1) learn, practice, generalize 2 skills/strategies for improved lifestyle balance focusing on self compassion and self care.  2) learn, practice, generalize sensory and mindfulness based self regulation skills/strategies to support improved participation in important life roles and relationships.  Will develop Aftercare Planning.   Will learn ways to expand support.       Area of Treatment Focus:  Symptom Management, Personal Safety and Develop Socialization / Interpersonal Relationship Skills    Therapeutic Interventions/Treatment Strategies:  Support, Feedback, Safety Assessments and Clarification    Response to Treatment Strategies:  Accepted Feedback, Gave Feedback, Listened, Focused on Goals, Attentive, Accepted Support and Alert    Name of Group:  Psychotherapy [id number]     Description and Outcome:  Mackenzie reports having good day today, despite daughter needing to be picked up from  due to health.  Mackenzie states that she has been continuing to practise mindfulness and has focused on taking time for self each day over weekend.  She is better able to \"let go\" of some tasks around the house and has found herself singing.  Reviewed glad activity and skill of radical acceptance.  Mackenzie verbalized understanding of material as it applies to her experience.  No safety concerns.  Engaged with the group.    Is this a Weekly Review of the Progress on the Treatment Plan?  No        "

## 2018-07-06 ENCOUNTER — HOSPITAL ENCOUNTER (OUTPATIENT)
Dept: BEHAVIORAL HEALTH | Facility: CLINIC | Age: 38
End: 2018-07-06
Attending: PSYCHIATRY & NEUROLOGY
Payer: COMMERCIAL

## 2018-07-06 PROCEDURE — H2012 BEHAV HLTH DAY TREAT, PER HR: HCPCS

## 2018-07-06 NOTE — PROGRESS NOTES
"Adult Mental Health Outpatient Group Therapy Progress Note     Client Initial Individualized Goals for Treatment:  I would like to be able to manage my depression and anxiety\".    See Initial Treatment suggestions for the client during the time between Diagnostic Assessment and completion of the Master Individualized Treatment Plan.    Treatment Goals:  1. Client will notify staff when needing assistance to develop or implement a coping plan to manage suicidal or self injurious urges  2. When in life skills group client will learn and practice 1-2 coping strategies to improve time management,improve self esteem and better management of stress providing an update of progress weekly.  3. In psychotherapy groups, Mackenzie will identify skills that she can utlize to manage depression and anxiety, 2x weekly, reporting effectiveness each time  4. Mackenzie will set at least one weekly goal related to developing wellness behaviors to support symptom stability during treatment and after discharge.     Area of Treatment Focus:  Symptom Management    Therapeutic Interventions/Treatment Strategies:  Support, Feedback, Safety Assessments, Structured Activity and Education    Response to Treatment Strategies:  Accepted Feedback, Listened, Attentive, Accepted Support and Alert    Name of Group:  Life Skills (11:00-11:50)     Group Participants: 3 of 4     Description and Outcome:  Client arrived late but presented as calm and alert once settled in. Psychoeducation skills addressed included a discussion related to self esteem with a focus on making effective decisions. Client acknowledged that her fears creates barriers in her decision making process. Goal #1 (no personal safety concerns reported or observed). Goal #2 Addressed per discussion.  Client would benefit from additional opportunities to practice and implement content from this session  in every day life,relationships and mental health recovery.    Is this a Weekly Review " of the Progress on the Treatment Plan?  Yes.      Are Treatment Plan Goals being addressed?  Yes, continue treatment goals      Are Treatment Plan Strategies to Address Goals Effective?  Yes, continue treatment strategies      Are there any current contracts in place?  No

## 2018-07-10 ENCOUNTER — HOSPITAL ENCOUNTER (OUTPATIENT)
Dept: BEHAVIORAL HEALTH | Facility: CLINIC | Age: 38
End: 2018-07-10
Attending: PSYCHIATRY & NEUROLOGY
Payer: COMMERCIAL

## 2018-07-10 PROCEDURE — H2012 BEHAV HLTH DAY TREAT, PER HR: HCPCS

## 2018-07-10 NOTE — PROGRESS NOTES
"  Adult Mental Health Outpatient Group Therapy Progress Note     Client Initial Individualized Goals for Treatment:  I would like to be able to manage my depression and anxiety\".     See Initial Treatment suggestions for the client during the time between Diagnostic Assessment and completion of the Master Individualized Treatment Plan.     Treatment Goals:  1. Client will notify staff when needing assistance to develop or implement a coping plan to manage suicidal or self injurious urges  2. When in life skills group client will learn and practice 1-2 coping strategies to improve time management,improve self esteem and better management of stress providing an update of progress weekly.  3. In psychotherapy groups, Mackenzie will identify skills that she can utlize to manage depression and anxiety, 2x weekly, reporting effectiveness each time  4. Mackenzie will set at least one weekly goal related to developing wellness behaviors to support symptom stability during treatment and after discharge.         Area of Treatment Focus:  Symptom Management, Personal Safety and Develop Socialization / Interpersonal Relationship Skills    Therapeutic Interventions/Treatment Strategies:  Support, Feedback, Safety Assessments and Clarification    Response to Treatment Strategies:  Accepted Feedback, Gave Feedback, Listened, Focused on Goals, Attentive, Accepted Support and Alert    Name of Group:  Psychotherapy [id number] 4/5 attending, death in family     Description and Outcome:  Mackenzie arrived late to programming today.  Was late due to dropping off children at childcare.  Mackenzie states that she has noticed that mood has improved.  She is taking better care of herself, walking and watching diet.  She states that she has lost 5 lbs and has improved energy.  Shared that she continues to experience negative thought patterns and becomes anxious expecting the worst to happen.  Mackenzie verbalizes understanding of session by sharing her " insights and demonstrates understanding by practising skills effectively.  No safety concerns.    Is this a Weekly Review of the Progress on the Treatment Plan?  No

## 2018-07-11 ENCOUNTER — HOSPITAL ENCOUNTER (OUTPATIENT)
Dept: BEHAVIORAL HEALTH | Facility: CLINIC | Age: 38
End: 2018-07-11
Attending: PSYCHIATRY & NEUROLOGY
Payer: COMMERCIAL

## 2018-07-11 PROCEDURE — H2012 BEHAV HLTH DAY TREAT, PER HR: HCPCS

## 2018-07-11 NOTE — PROGRESS NOTES
"Adult Mental Health Outpatient Group Therapy Progress Note        Client Initial Individualized Goals for Treatment:  I would like to be able to manage my depression and anxiety\".     See Initial Treatment suggestions for the client during the time between Diagnostic Assessment and completion of the Master Individualized Treatment Plan.     Treatment Goals:  1. Client will notify staff when needing assistance to develop or implement a coping plan to manage suicidal or self injurious urges  2. When in life skills group client will learn and practice 1-2 coping strategies to improve time management,improve self esteem and better management of stress providing an update of progress weekly.  3. In psychotherapy groups, Mackenzie will identify skills that she can utlize to manage depression and anxiety, 2x weekly, reporting effectiveness each time  4. Mackenzie will set at least one weekly goal related to developing wellness behaviors to support symptom stability during treatment and after discharge.     Area of Treatment Focus:  Symptom Management, Personal Safety and Community Resources/Discharge Planning    Therapeutic Interventions/Treatment Strategies:  Feedback and Education    Response to Treatment Strategies:  Listened, Focused on Goals and Attentive    Name of Group:  Wellness [id number]     Group Participants: 4 of 5.      Description and Outcome:  Client participated in an educational topic on self-esteem and personal strengths.  Writer reviewed components of self-assessment.  Client identified strengths in a variety of areas and identified skill practice that supported those strengths.  Mackenzie completed the written activity thoroughly and gave feedback to peers.  She identified use of journaling to help her identify skills she is using and to practice more positive self-talk.    Client demonstrated understanding of session content by sharing skills she is using to improve self-esteem and to decrease negative " self-talk..      Is this a Weekly Review of the Progress on the Treatment Plan?  No

## 2018-07-11 NOTE — PROGRESS NOTES
"  Adult Mental Health Outpatient Group Therapy Progress Note     Client Initial Individualized Goals for Treatment:  I would like to be able to manage my depression and anxiety\".     See Initial Treatment suggestions for the client during the time between Diagnostic Assessment and completion of the Master Individualized Treatment Plan.     Treatment Goals:  1. Client will notify staff when needing assistance to develop or implement a coping plan to manage suicidal or self injurious urges  2. When in life skills group client will learn and practice 1-2 coping strategies to improve time management,improve self esteem and better management of stress providing an update of progress weekly.  3. In psychotherapy groups, Mackenzie will identify skills that she can utlize to manage depression and anxiety, 2x weekly, reporting effectiveness each time  4. Mackenzie will set at least one weekly goal related to developing wellness behaviors to support symptom stability during treatment and after discharge.         Area of Treatment Focus:  Symptom Management and Develop Socialization / Interpersonal Relationship Skills    Therapeutic Interventions/Treatment Strategies:  Support, Feedback, Safety Assessments and Clarification    Response to Treatment Strategies:  Accepted Feedback, Gave Feedback, Listened, Focused on Goals, Attentive, Accepted Support and Alert    Name of Group:  Psychotherapy [id number], 3 out of 6 participating, reason for absence include death in family, appointment and unknown        Description and Outcome:  Mackenzie states that she had good morning yesterday.  However father in law and sister in law were visiting yesterday afternoon and Mackenzie states that she was aware of many negative thoughts, including comparisons and shoulds.  With prompting, Mackenzie accepted feedback from peers encouraging her to celebrate her ability to stay present with her relatives despite urge to leave.  Mackenzie states that she is " fearful of return to work, that anxiety will increase and that she will not be able to perform job.  Mackenzie demonstrated understanding of session by exploring negative thoughts and their distortions, as well as by accepting support.  No safety concerns.    Is this a Weekly Review of the Progress on the Treatment Plan?  No

## 2018-07-12 NOTE — PROGRESS NOTES
"Adult Mental Health Outpatient Group Therapy Progress Note     Client Initial Individualized Goals for Treatment:  I would like to be able to manage my depression and anxiety\".    See Initial Treatment suggestions for the client during the time between Diagnostic Assessment and completion of the Master Individualized Treatment Plan.    Treatment Goals:  1. Client will notify staff when needing assistance to develop or implement a coping plan to manage suicidal or self injurious urges  2. When in life skills group client will learn and practice 1-2 coping strategies to improve time management,improve self esteem and better management of stress providing an update of progress weekly.  3. In psychotherapy groups, Mackenzie will identify skills that she can utlize to manage depression and anxiety, 2x weekly, reporting effectiveness each time  4. Mackenzie will set at least one weekly goal related to developing wellness behaviors to support symptom stability during treatment and after discharge.     Area of Treatment Focus:  Symptom Management    Therapeutic Interventions/Treatment Strategies:  Support, Feedback, Safety Assessments, Structured Activity and Education    Response to Treatment Strategies:  Accepted Feedback, Listened, Attentive, Accepted Support and Alert    Name of Group:  Mental Health Management (10:00-10:50)     Group Participants: 3 of 6     Description and Outcome:  Client presented as clam and alert. She accepted positive feedback related to arriving to the program on time for the first time. Psychoeducation skills addressed included a discussion related to mental health recovery.Clients very low self esteem and struggles with assertive communication. She admits to not being happy and does not have much fun in her life.Her life is not very goal directed at this time but feels she does have purpose in her life 75% of the time. Goal #1 (no personal safety concerns reported or observed). Goal #2 Addressed " per discussion.   Client would benefit from additional opportunities to practice and implement content from this session  in every day life,relationships and mental health recovery.    Is this a Weekly Review of the Progress on the Treatment Plan?  Yes.      Are Treatment Plan Goals being addressed?  Yes, continue treatment goals      Are Treatment Plan Strategies to Address Goals Effective?  Yes, continue treatment strategies      Are there any current contracts in place?  No

## 2018-07-12 NOTE — PROGRESS NOTES
"Adult Mental Health Outpatient Group Therapy Progress Note     Client Initial Individualized Goals for Treatment:  I would like to be able to manage my depression and anxiety\".    See Initial Treatment suggestions for the client during the time between Diagnostic Assessment and completion of the Master Individualized Treatment Plan.    Treatment Goals:  1. Client will notify staff when needing assistance to develop or implement a coping plan to manage suicidal or self injurious urges  2. When in life skills group client will learn and practice 1-2 coping strategies to improve time management,improve self esteem and better management of stress providing an update of progress weekly.  3. In psychotherapy groups, Mackenzie will identify skills that she can utlize to manage depression and anxiety, 2x weekly, reporting effectiveness each time  4. Mackenzie will set at least one weekly goal related to developing wellness behaviors to support symptom stability during treatment and after discharge.     Area of Treatment Focus:  Symptom Management    Therapeutic Interventions/Treatment Strategies:  Support, Feedback, Safety Assessments, Structured Activity and Education    Response to Treatment Strategies:  Accepted Feedback, Listened, Attentive, Accepted Support and Alert    Name of Group:  Life Skills (11:00-11:50)     Group Participants: 3 of 6     Description and Outcome:  Client presented as clam and alert with fair to good focus and concentration. Psychoeducation addressed included a discussion related to communication effectiveness. Goal #1 (no personal safety concerns reported or observed). Goal #2 Client has been working to organize her personal papers and handouts from her treatment programs.  Client would benefit from additional opportunities to practice and implement content from this session  in every day life,relationships and mental health recovery.    Is this a Weekly Review of the Progress on the Treatment " Plan?  Yes.      Are Treatment Plan Goals being addressed?  Yes, continue treatment goals      Are Treatment Plan Strategies to Address Goals Effective?  Yes, continue treatment strategies      Are there any current contracts in place?  No

## 2018-07-17 ENCOUNTER — HOSPITAL ENCOUNTER (OUTPATIENT)
Dept: BEHAVIORAL HEALTH | Facility: CLINIC | Age: 38
End: 2018-07-17
Attending: PSYCHIATRY & NEUROLOGY
Payer: COMMERCIAL

## 2018-07-17 PROCEDURE — H2012 BEHAV HLTH DAY TREAT, PER HR: HCPCS

## 2018-07-18 NOTE — PROGRESS NOTES
"  Adult Mental Health Outpatient Group Therapy Progress Note     Client Initial Individualized Goals for Treatment:  I would like to be able to manage my depression and anxiety\".     See Initial Treatment suggestions for the client during the time between Diagnostic Assessment and completion of the Master Individualized Treatment Plan.     Treatment Goals:  1. Client will notify staff when needing assistance to develop or implement a coping plan to manage suicidal or self injurious urges  2. When in life skills group client will learn and practice 1-2 coping strategies to improve time management,improve self esteem and better management of stress providing an update of progress weekly.  3. In psychotherapy groups, Mackenzie will identify skills that she can utlize to manage depression and anxiety, 2x weekly, reporting effectiveness each time  4. Mackenzie will set at least one weekly goal related to developing wellness behaviors to support symptom stability during treatment and after discharge.         Area of Treatment Focus:  Symptom Management, Personal Safety and Develop Socialization / Interpersonal Relationship Skills    Therapeutic Interventions/Treatment Strategies:  Support, Feedback, Safety Assessments, Clarification and Education    Response to Treatment Strategies:  Accepted Feedback, Gave Feedback, Listened, Focused on Goals, Attentive, Accepted Support and Alert    Name of Group:  Psychotherapy [id number] 6/7 participants     Description and Outcome:  Mackenzie states that she had conflict with  last Thursday by setting limits with him and focusing instead on her self care.  She reports he sabotaged childcare on Friday, making it so that Mackenzie was unable to attend day treatment.  Mackenzie continues to focus on self care, including exercise and identifying needs.  In this way she demonstrates understanding of sessions.  She identifies anxiety regarding work to work projected to be at end of August.  " She will need to continue to address marital relationship.  She was given number of individual therapist to contact. No safety concerns.    Is this a Weekly Review of the Progress on the Treatment Plan?  No

## 2018-07-18 NOTE — PROGRESS NOTES
"Adult Mental Health Outpatient Group Therapy Progress Note     Client Initial Individualized Goals for Treatment:  I would like to be able to manage my depression and anxiety\".    See Initial Treatment suggestions for the client during the time between Diagnostic Assessment and completion of the Master Individualized Treatment Plan.    Treatment Goals:  1. Client will notify staff when needing assistance to develop or implement a coping plan to manage suicidal or self injurious urges  2. When in life skills group client will learn and practice 1-2 coping strategies to improve time management,improve self esteem and better management of stress providing an update of progress weekly.  3. In psychotherapy groups, Mackenzie will identify skills that she can utlize to manage depression and anxiety, 2x weekly, reporting effectiveness each time  4. Mackenzie will set at least one weekly goal related to developing wellness behaviors to support symptom stability during treatment and after discharge.     Area of Treatment Focus:  Symptom Management    Therapeutic Interventions/Treatment Strategies:  Support, Feedback, Safety Assessments, Structured Activity and Education    Response to Treatment Strategies:  Accepted Feedback, Listened, Attentive, Accepted Support and Alert    Name of Group:  Life Skills (9:00-9:50)     Group Participants: 6 of 7     Description and Outcome:  Client presented as clam and alert even after arriving late to group. Psychoeducation addressed included a discussion related to sources of stress and coping skills.   Goal #1 (no personal safety concerns reported or observed). Goal #2 Addressed per discussion.  Client would benefit from additional opportunities to practice and implement content from this session  in every day life,relationships and mental health recovery.    Is this a Weekly Review of the Progress on the Treatment Plan?  Yes.      Are Treatment Plan Goals being addressed?  Yes, continue " treatment goals      Are Treatment Plan Strategies to Address Goals Effective?  Yes, continue treatment strategies      Are there any current contracts in place?  No

## 2018-07-20 NOTE — ADDENDUM NOTE
Encounter addended by: Rhoda Costello RN on: 7/20/2018  1:03 PM<BR>     Actions taken: Flowsheet accepted

## 2018-07-23 NOTE — PROGRESS NOTES
"  Adult Mental Health Outpatient Group Therapy Progress Note     Client Initial Individualized Goals for Treatment: I would like to be able to manage my depression and anxiety\".     Treatment Goals:  1. Client will notify staff when needing assistance to develop or implement a coping plan to manage suicidal or self injurious urges  2. When in life skills group client will learn and practice 1-2 coping strategies to improve time management,improve self esteem and better management of stress providing an update of progress weekly.  3. In psychotherapy groups, Mackenzie will identify skills that she can utlize to manage depression and anxiety, 2x weekly, reporting effectiveness each time  4. Mackenzie will set at least one weekly goal related to developing wellness behaviors to support symptom stability during treatment and after discharge.     Area of Treatment Focus:  Symptom Management, Develop / Improve Independent Living Skills and Develop Socialization / Interpersonal Relationship Skills    Therapeutic Interventions/Treatment Strategies:  Support, Feedback, Safety Assessments, Structured Activity and Problem Solving    Response to Treatment Strategies:  Accepted Feedback, Gave Feedback, Listened, Focused on Goals, Attentive and Accepted Support    Name of Group:  [facility] [id number]     Description and Outcome:  6/7 attended this session.    Client attended and participated in a structured life skills psychoeducation group where intervention focused on experiential learning, practice, and generalization of skills through the use of supported social interaction, structured therapeutic tasks, and engagement in functional tasks in order to improve function in valued roles, routines, and independent living skills. Client had a calm, even affect. Actively participated in group despite reporting increased symptoms recently. Provided client with verbal and experiential psychoeducation material focused on problem solving, " critical thinking, and frustration tolerance in order to promote participation in valued roles, routines, and relationships. Client verbalized understanding of session content by identifying skills used and barriers faced during task. Client addressed ITP goal number 2 in this session.    Is this a Weekly Review of the Progress on the Treatment Plan?  No

## 2018-07-24 ENCOUNTER — HOSPITAL ENCOUNTER (OUTPATIENT)
Dept: BEHAVIORAL HEALTH | Facility: CLINIC | Age: 38
End: 2018-07-24
Attending: PSYCHIATRY & NEUROLOGY
Payer: COMMERCIAL

## 2018-07-24 PROCEDURE — H2012 BEHAV HLTH DAY TREAT, PER HR: HCPCS

## 2018-07-24 NOTE — PROGRESS NOTES
Acknowledgement of Current Treatment Plan       I have reviewed my treatment plan with my therapist / counselor on 7/27/18.   I agree with the plan as it is written in the electronic health record. (4A)    Name:      Signature:  Mackenzie Le MD  Psychiatrist    Robbie Guillen, NP    Mariposa Rehman, [facility], BC-DMT  Psychotherapist    Rhoda Costello, RN  Nurse Liaison    Yimi Xie, OTR/L  Occupational Therapist

## 2018-07-24 NOTE — PROGRESS NOTES
"  Adult Mental Health Outpatient Group Therapy Progress Note     Client Initial Individualized Goals for Treatment:  I would like to be able to manage my depression and anxiety\".     See Initial Treatment suggestions for the client during the time between Diagnostic Assessment and completion of the Master Individualized Treatment Plan.     Treatment Goals:  1. Client will notify staff when needing assistance to develop or implement a coping plan to manage suicidal or self injurious urges  2. When in life skills group client will learn and practice 1-2 coping strategies to improve time management,improve self esteem and better management of stress providing an update of progress weekly.  3. In psychotherapy groups, Mackenzie will identify skills that she can utlize to manage depression and anxiety, 2x weekly, reporting effectiveness each time  4. Mackenzie will set at least one weekly goal related to developing wellness behaviors to support symptom stability during treatment and after discharge.         Area of Treatment Focus:  Symptom Management, Personal Safety and Develop Socialization / Interpersonal Relationship Skills    Therapeutic Interventions/Treatment Strategies:  Support, Feedback, Safety Assessments and Clarification    Response to Treatment Strategies:  Accepted Feedback, Gave Feedback, Listened, Focused on Goals, Attentive, Accepted Support and Alert    Name of Group:  Psychotherapy [id number], 4/6 participants      Description and Outcome:  Mackenzie states reason for absence last week is power being cut off at home.  She was able to resolve this.  She is concerned about return to work and is thinking about goals for 2 years out.  Shared her thoughts about being a mom for a large family and her thoughts of being judged.  She accepted positive feedback from peers regarding her patience and strength.  Mackenzie acknowledges difficulty in acknowledging her strengths, but demonstrated understanding of session by " challenging her negative self talk.  She is beginning to prepare for discharge.  No safety concerns.    Is this a Weekly Review of the Progress on the Treatment Plan?  No

## 2018-07-24 NOTE — PROGRESS NOTES
"Adult Mental Health Outpatient Group Therapy Progress Note     Client Initial Individualized Goals for Treatment:  I would like to be able to manage my depression and anxiety\".    See Initial Treatment suggestions for the client during the time between Diagnostic Assessment and completion of the Master Individualized Treatment Plan.    Treatment Goals:  1. Client will notify staff when needing assistance to develop or implement a coping plan to manage suicidal or self injurious urges  2. When in life skills group client will learn and practice 1-2 coping strategies to improve time management,improve self esteem and better management of stress providing an update of progress weekly.  3. In psychotherapy groups, Mackenzie will identify skills that she can utlize to manage depression and anxiety, 2x weekly, reporting effectiveness each time  4. Mackenzie will set at least one weekly goal related to developing wellness behaviors to support symptom stability during treatment and after discharge.     Area of Treatment Focus:  Symptom Management    Therapeutic Interventions/Treatment Strategies:  Support, Feedback, Safety Assessments, Structured Activity and Education    Response to Treatment Strategies:  Accepted Feedback, Listened, Attentive, Accepted Support and Alert    Name of Group:  Life Skills (9:00-9:50)     Group Participants: 4 of 6     Description and Outcome:  Client presented as clam and alert even after arriving late to group. Psychoeducation addressed included a discussion related to stress management and resiliency skills  Goal #1 (no personal safety concerns reported or observed). Goal #2 Addressed per discussion.  Client would benefit from additional opportunities to practice and implement content from this session  in every day life,relationships and mental health recovery.    Is this a Weekly Review of the Progress on the Treatment Plan?  Yes.      Are Treatment Plan Goals being addressed?  Yes, continue " treatment goals      Are Treatment Plan Strategies to Address Goals Effective?  Yes, continue treatment strategies      Are there any current contracts in place?  No

## 2018-07-27 ENCOUNTER — HOSPITAL ENCOUNTER (OUTPATIENT)
Dept: BEHAVIORAL HEALTH | Facility: CLINIC | Age: 38
End: 2018-07-27
Attending: PSYCHIATRY & NEUROLOGY
Payer: COMMERCIAL

## 2018-07-27 PROCEDURE — H2012 BEHAV HLTH DAY TREAT, PER HR: HCPCS

## 2018-07-30 NOTE — PROGRESS NOTES
"Adult Mental Health Outpatient Group Therapy Progress Note     Client Initial Individualized Goals for Treatment:  I would like to be able to manage my depression and anxiety\".    See Initial Treatment suggestions for the client during the time between Diagnostic Assessment and completion of the Master Individualized Treatment Plan.    Treatment Goals:  1. Client will notify staff when needing assistance to develop or implement a coping plan to manage suicidal or self injurious urges  2. When in life skills group client will learn and practice 1-2 coping strategies to improve time management,improve self esteem and better management of stress providing an update of progress weekly.  3. In psychotherapy groups, Mackenzie will identify skills that she can utlize to manage depression and anxiety, 2x weekly, reporting effectiveness each time  4. Mackenzie will set at least one weekly goal related to developing wellness behaviors to support symptom stability during treatment and after discharge.     Area of Treatment Focus:  Symptom Management    Therapeutic Interventions/Treatment Strategies:  Support, Feedback, Safety Assessments, Structured Activity and Education    Response to Treatment Strategies:  Accepted Feedback, Listened, Attentive, Accepted Support and Alert    Name of Group:  Life Skills (9:00-9:50)     Group Participants: 5 of 6     Description and Outcome:  Client presented as clam and alert. Psychoeducation addressed included a discussion related to self esteem with a focus on strategies to improve self confidence. Also therapist completed a review of her treatment planning goals.Goal #1 (no personal safety concerns reported or observed). Goal #2 Addressed per discussion.  Client would benefit from additional opportunities to practice and implement content from this session  in every day life,relationships and mental health recovery.    Is this a Weekly Review of the Progress on the Treatment Plan?  Yes.  "     Are Treatment Plan Goals being addressed?  Yes, continue treatment goals      Are Treatment Plan Strategies to Address Goals Effective?  Yes, continue treatment strategies      Are there any current contracts in place?  No

## 2018-07-31 ENCOUNTER — HOSPITAL ENCOUNTER (OUTPATIENT)
Dept: BEHAVIORAL HEALTH | Facility: CLINIC | Age: 38
End: 2018-07-31
Attending: PSYCHIATRY & NEUROLOGY
Payer: COMMERCIAL

## 2018-07-31 PROCEDURE — H2012 BEHAV HLTH DAY TREAT, PER HR: HCPCS

## 2018-07-31 NOTE — ADDENDUM NOTE
Encounter addended by: Sabine Watts, OT on: 7/31/2018  7:31 AM<BR>     Actions taken: Flowsheet accepted, Sign clinical note

## 2018-07-31 NOTE — PROGRESS NOTES
"Adult Mental Health Outpatient Group Therapy Progress Note         Client Initial Individualized Goals for Treatment:  I would like to be able to manage my depression and anxiety\".     See Initial Treatment suggestions for the client during the time between Diagnostic Assessment and completion of the Master Individualized Treatment Plan.     Treatment Goals:  1. Client will notify staff when needing assistance to develop or implement a coping plan to manage suicidal or self injurious urges  2. When in life skills group client will learn and practice 1-2 coping strategies to improve time management,improve self esteem and better management of stress providing an update of progress weekly.  3. In psychotherapy groups, Mackenzie will identify skills that she can utlize to manage depression and anxiety, 2x weekly, reporting effectiveness each time  4. Mackenzie will set at least one weekly goal related to developing wellness behaviors to support symptom stability during treatment and after discharge.      Area of Treatment Focus:  Symptom Management     Therapeutic Interventions/Treatment Strategies:  Support, Feedback, Safety Assessments, Structured Activity and Education     Response to Treatment Strategies:  Accepted Feedback, Listened, Attentive, Accepted Support and Alert     Name of Group:  Life Skills (9:00-9:50)      Group Participants: 4 of 6      Description and Outcome:   Mackenzie reported being safe today. She reported that she went on a picnic with others in the park and took her 7 kids, who range from 9 months old to 11 years old. She reported that she does get some time each day for herself.  She stated that she is pregnant now, but has not notified her psychiatrist about this.  The group discussed medications and pregnancy with her and she reported that she is not taking her anxiety medications. She reported that she came to group to support others and listen to others, but preferred not to express any other " issues about herself to the group, today.     Client would benefit from additional opportunities to practice and implement content from this session  in every day life,relationships and mental health recovery.     Is this a Weekly Review of the Progress on the Treatment Plan?  Yes.       Are Treatment Plan Goals being addressed?  Yes, continue treatment goals        Are Treatment Plan Strategies to Address Goals Effective?  Yes, continue treatment strategies

## 2018-07-31 NOTE — PROGRESS NOTES
Adult Mental Health Outpatient Group Therapy Progress Note     Client Initial Individualized Goals for Treatment:  client will learn and practice 1-2 coping strategies to improve time management,improve self esteem and better management of stress providing an update of progress weekly.    See Initial Treatment suggestions for the client during the time between Diagnostic Assessment and completion of the Master Individualized Treatment Plan.    Treatment Goals:    Pt will notify staff when needing assistance to develop or implement a coping plan to manage suicidal or self injurious urges.     in life skills group client will learn and practice 1-2 coping strategies to improve time management,improve self esteem and better management of stress providing an update of progress weekly.    In psychotherapy groups, Mackenzie will identify skills that she can utlize to manage depression and anxiety, 2x weekly, reporting effectiveness each time    Pt will set at least one weekly goal related to developing wellness behaviors to support symptom stability during treatment and after discharge.     Area of Treatment Focus:  Develop Socialization / Interpersonal Relationship Skills    Therapeutic Interventions/Treatment Strategies:  Support, Feedback, Structured Activity and Education    Response to Treatment Strategies:  Accepted Feedback, Listened, Attentive and Accepted Support    Name of Group:  Mental Health Management     Group Participants: 3 of 6.      Description and Outcome:  Client demonstrated understanding of session content by verbalizing understanding.    Is this a Weekly Review of the Progress on the Treatment Plan?  No

## 2018-07-31 NOTE — PROGRESS NOTES
"  Adult Mental Health Outpatient Group Therapy Progress Note     Client Initial Individualized Goals for Treatment: I would like to be able to manage my depression and anxiety\".     Treatment Goals:  1. Client will notify staff when needing assistance to develop or implement a coping plan to manage suicidal or self injurious urges  2. When in life skills group client will learn and practice 1-2 coping strategies to improve time management,improve self esteem and better management of stress providing an update of progress weekly.  3. In psychotherapy groups, Mackenzie will identify skills that she can utlize to manage depression and anxiety, 2x weekly, reporting effectiveness each time  4. Mackenzie will set at least one weekly goal related to developing wellness behaviors to support symptom stability during treatment and after discharge.     Area of Treatment Focus:  Symptom Management, Develop / Improve Independent Living Skills and Develop Socialization / Interpersonal Relationship Skills    Therapeutic Interventions/Treatment Strategies:  Support, Feedback, Safety Assessments, Structured Activity and Problem Solving    Response to Treatment Strategies:  Accepted Feedback, Gave Feedback, Listened, Focused on Goals, Attentive and Accepted Support    Name of Group:  [facility] [id number]     Description and Outcome:  4/6 attended this session.    Client attended and participated in a structured life skills psychoeducation group where intervention focused on experiential learning, practice, and generalization of skills through the use of supported social interaction, structured therapeutic tasks, and engagement in functional tasks in order to improve function in valued roles, routines, and independent living skills. Client had a calm, even affect. Attentive throughout session. Provided client with verbal and experiential psychoeducation material focused on sensory based self regulation in order to promote participation in " valued roles, routines, and relationships. Client demonstrated understanding of session content by practicing skills in context of session. Client addressed ITP goal number 2 in this session.    Is this a Weekly Review of the Progress on the Treatment Plan?  No

## 2018-08-01 ENCOUNTER — HOSPITAL ENCOUNTER (OUTPATIENT)
Dept: BEHAVIORAL HEALTH | Facility: CLINIC | Age: 38
End: 2018-08-01
Attending: PSYCHIATRY & NEUROLOGY
Payer: COMMERCIAL

## 2018-08-01 PROCEDURE — H2012 BEHAV HLTH DAY TREAT, PER HR: HCPCS

## 2018-08-01 NOTE — PROGRESS NOTES
"Adult Mental Health Outpatient Group Therapy Progress Note     Client Initial Individualized Goals for Treatment:  I would like to be able to manage my depression and anxiety\".    See Initial Treatment suggestions for the client during the time between Diagnostic Assessment and completion of the Master Individualized Treatment Plan.    Treatment Goals:  1. Client will notify staff when needing assistance to develop or implement a coping plan to manage suicidal or self injurious urges  2. When in life skills group client will learn and practice 1-2 coping strategies to improve time management,improve self esteem and better management of stress providing an update of progress weekly.  3. In psychotherapy groups, Mackenzie will identify skills that she can utlize to manage depression and anxiety, 2x weekly, reporting effectiveness each time  4. Mackenzie will set at least one weekly goal related to developing wellness behaviors to support symptom stability during treatment and after discharge.     Area of Treatment Focus:  Symptom Management    Therapeutic Interventions/Treatment Strategies:  Support, Feedback, Safety Assessments, Structured Activity and Education    Response to Treatment Strategies:  Accepted Feedback, Listened, Attentive, Accepted Support and Alert    Name of Group:  Life Skills (11:00-11:50)     Group Participants: 4 of 6     Description and Outcome:  Client presented as clam and alert. Psychoeducation addressed included a discussion related to communication with a focus on social risk taking. Also therapist completed a review of her treatment planning goals.Goal #1 (no personal safety concerns reported or observed). Goal #2 Addressed per discussion.  Client would benefit from additional opportunities to practice and implement content from this session  in every day life,relationships and mental health recovery.    Is this a Weekly Review of the Progress on the Treatment Plan?  Yes.      Are Treatment " Plan Goals being addressed?  Yes, continue treatment goals      Are Treatment Plan Strategies to Address Goals Effective?  Yes, continue treatment strategies      Are there any current contracts in place?  No

## 2018-08-01 NOTE — PROGRESS NOTES
"Adult Mental Health Outpatient Group Therapy Progress Note     Client Initial Individualized Goals for Treatment:  I would like to be able to manage my depression and anxiety\".    See Initial Treatment suggestions for the client during the time between Diagnostic Assessment and completion of the Master Individualized Treatment Plan.    Treatment Goals:  1. Client will notify staff when needing assistance to develop or implement a coping plan to manage suicidal or self injurious urges  2. When in life skills group client will learn and practice 1-2 coping strategies to improve time management,improve self esteem and better management of stress providing an update of progress weekly.  3. In psychotherapy groups, Mackenzie will identify skills that she can utlize to manage depression and anxiety, 2x weekly, reporting effectiveness each time  4. Mackenzie will set at least one weekly goal related to developing wellness behaviors to support symptom stability during treatment and after discharge.     Area of Treatment Focus:  Symptom Management    Therapeutic Interventions/Treatment Strategies:  Support, Feedback, Safety Assessments, Structured Activity and Education    Response to Treatment Strategies:  Accepted Feedback, Listened, Attentive, Accepted Support and Alert    Name of Group:  [facility] (10:00-10:50)     Group Participants: 4 of 6     Description and Outcome:  Client presented as clam and alert even after arriving late to group. Psychoeducation addressed included a discussion related  To self esteem with a focus on problem solving. She discussed a problem related to time management.Therapist provided strategies for self improvement.  Goal #1 (no personal safety concerns reported or observed). Goal #2 Addressed per discussion.  Client would benefit from additional opportunities to practice and implement content from this session  in every day life,relationships and mental health recovery.    Is this a " Weekly Review of the Progress on the Treatment Plan?  Yes.      Are Treatment Plan Goals being addressed?  Yes, continue treatment goals      Are Treatment Plan Strategies to Address Goals Effective?  Yes, continue treatment strategies      Are there any current contracts in place?  No

## 2018-08-08 ENCOUNTER — TELEPHONE (OUTPATIENT)
Dept: BEHAVIORAL HEALTH | Facility: CLINIC | Age: 38
End: 2018-08-08

## 2018-08-08 NOTE — TELEPHONE ENCOUNTER
This therapist left a voice mail message on client's voice mail to call with an update since she did not attend our program yesterday(illness) or today.

## 2018-08-10 ENCOUNTER — HOSPITAL ENCOUNTER (OUTPATIENT)
Dept: BEHAVIORAL HEALTH | Facility: CLINIC | Age: 38
End: 2018-08-10
Attending: PSYCHIATRY & NEUROLOGY
Payer: COMMERCIAL

## 2018-08-10 PROCEDURE — H2012 BEHAV HLTH DAY TREAT, PER HR: HCPCS

## 2018-08-10 NOTE — PROGRESS NOTES
"  Adult Mental Health Outpatient Group Therapy Progress Note     Client Initial Individualized Goals for Treatment:  I would like to be able to manage my depression and anxiety\".     See Initial Treatment suggestions for the client during the time between Diagnostic Assessment and completion of the Master Individualized Treatment Plan.     Treatment Goals:  1. Client will notify staff when needing assistance to develop or implement a coping plan to manage suicidal or self injurious urges  2. When in life skills group client will learn and practice 1-2 coping strategies to improve time management,improve self esteem and better management of stress providing an update of progress weekly.  3. In psychotherapy groups, Mackenzie will identify skills that she can utlize to manage depression and anxiety, 2x weekly, reporting effectiveness each time  4. Mackenzie will set at least one weekly goal related to developing wellness behaviors to support symptom stability during treatment and after discharge.         Area of Treatment Focus:  Symptom Management and Develop Socialization / Interpersonal Relationship Skills    Therapeutic Interventions/Treatment Strategies:  Support, Feedback, Structured Activity and Clarification    Response to Treatment Strategies:  Accepted Feedback, Gave Feedback, Listened, Focused on Goals, Attentive, Accepted Support and Alert    Name of Group:   Psychotherapy [id number], 3 of 6 participants.      Description and Outcome:  Mackenzie returned to programming after illness.  She shared that she has realized that she has not taken care of self over past couple of weeks.  States that she would like to stay in program at least a month to aid in return to work and school if she chooses to do that.  She is rethinking return to work due to pregnancy.  States  is supportive.  Mackenzie has concerns about change in program staff.  States that she has difficulty with change and is concerned about all male " staff.  Will continue to process.  Mackenzie demonstrates understanding of session by being assertive with feelings and needs.  No safety concerns.    Is this a Weekly Review of the Progress on the Treatment Plan?  No

## 2018-08-15 ENCOUNTER — HOSPITAL ENCOUNTER (OUTPATIENT)
Dept: BEHAVIORAL HEALTH | Facility: CLINIC | Age: 38
End: 2018-08-15
Attending: PSYCHIATRY & NEUROLOGY
Payer: COMMERCIAL

## 2018-08-15 PROCEDURE — H2012 BEHAV HLTH DAY TREAT, PER HR: HCPCS

## 2018-08-15 NOTE — PROGRESS NOTES
"Adult Mental Health Outpatient Group Therapy Progress Note     Client Initial Individualized Goals for Treatment:  I would like to be able to manage my depression and anxiety\".    See Initial Treatment suggestions for the client during the time between Diagnostic Assessment and completion of the Master Individualized Treatment Plan.    Treatment Goals:  1. Client will notify staff when needing assistance to develop or implement a coping plan to manage suicidal or self injurious urges  2. When in life skills group client will learn and practice 1-2 coping strategies to improve time management,improve self esteem and better management of stress providing an update of progress weekly.  3. In psychotherapy groups, Mackenzie will identify skills that she can utlize to manage depression and anxiety, 2x weekly, reporting effectiveness each time  4. Mackenzie will set at least one weekly goal related to developing wellness behaviors to support symptom stability during treatment and after discharge.     Area of Treatment Focus:  Symptom Management    Therapeutic Interventions/Treatment Strategies:  Support, Feedback, Safety Assessments, Structured Activity and Education    Response to Treatment Strategies:  Accepted Feedback, Listened, Attentive, Accepted Support and Alert    Name of Group:  Mental Health Management (10:00-11:50)     Group Participants: 2  of 5 Track 4A clients (scheduled/unscheduled absences) merged with 1 of 3 Track 1A  clients (scheduled/unscheduled absences)  to make a total of 3  participants.      Description and Outcome:  Client presented as clam and alert with good focus and concentration. Psychoeducation addressed included a discussion related to relapse prevention and mental health recovery. Reviewed discharge plan which will include individual therapy and support groups ([facility]) goals.Goal #1 (no personal safety concerns reported or observed). Goal #2 Client reports progress with self " "esteem(\"7\" on a scale of 1-10) and stress management but time mangement is still a struggle mostly due to  issues.  Client would benefit from additional opportunities to practice and implement content from this session  in every day life,relationships and mental health recovery.    Is this a Weekly Review of the Progress on the Treatment Plan?  Yes.      Are Treatment Plan Goals being addressed?  Yes, continue treatment goals      Are Treatment Plan Strategies to Address Goals Effective?  Yes, continue treatment strategies      Are there any current contracts in place?  No      "

## 2018-08-15 NOTE — PROGRESS NOTES
"Adult Mental Health Outpatient Group Therapy Progress Note     Client Initial Individualized Goals for Treatment:  I would like to be able to manage my depression and anxiety\".    See Initial Treatment suggestions for the client during the time between Diagnostic Assessment and completion of the Master Individualized Treatment Plan.    Treatment Goals:  1. Client will notify staff when needing assistance to develop or implement a coping plan to manage suicidal or self injurious urges  2. When in life skills group client will learn and practice 1-2 coping strategies to improve time management,improve self esteem and better management of stress providing an update of progress weekly.  3. In psychotherapy groups, Mackenzie will identify skills that she can utlize to manage depression and anxiety, 2x weekly, reporting effectiveness each time  4. Mackenzie will set at least one weekly goal related to developing wellness behaviors to support symptom stability during treatment and after discharge.     Area of Treatment Focus:  Symptom Management    Therapeutic Interventions/Treatment Strategies:  Support, Feedback, Safety Assessments, Structured Activity and Education    Response to Treatment Strategies:  Accepted Feedback, Listened, Attentive, Accepted Support and Alert    Name of Group:  Life Skills (11:00-11:50)     Group Participants: 2  of 5 Track 4A clients (scheduled/unscheduled absences) merged with 1 of 3 Track 1A  clients (scheduled/unscheduled absences)  to make a total of 3  participants.      Description and Outcome:  Client presented as clam and alert with good focus and concentration. Psychoeducation addressed included a discussion related to assertive communication.Goal #1 (no personal safety concerns reported or observed). Goal #2 Not addressed.  Client would benefit from additional opportunities to practice and implement content from this session  in every day life,relationships and mental health " recovery.    Is this a Weekly Review of the Progress on the Treatment Plan?  Yes.      Are Treatment Plan Goals being addressed?  Yes, continue treatment goals      Are Treatment Plan Strategies to Address Goals Effective?  Yes, continue treatment strategies      Are there any current contracts in place?  No

## 2018-08-16 NOTE — PROGRESS NOTES
"Adult Mental Health Outpatient Group Therapy Progress Note     Date: 8/10/18    Client Initial Individualized Goals for Treatment:  I would like to be able to manage my depression and anxiety\".      Treatment Goals:  1. Client will notify staff when needing assistance to develop or implement a coping plan to manage suicidal or self injurious urges  2. When in life skills group client will learn and practice 1-2 coping strategies to improve time management,improve self esteem and better management of stress providing an update of progress weekly.  3. In psychotherapy groups, Mackenzie will identify skills that she can utlize to manage depression and anxiety, 2x weekly, reporting effectiveness each time  4. Mackenzie will set at least one weekly goal related to developing wellness behaviors to support symptom stability during treatment and after discharge.     Area of Treatment Focus:  Symptom Management    Therapeutic Interventions/Treatment Strategies:  Support, Feedback, Safety Assessments, Structured Activity and Education    Response to Treatment Strategies:  Accepted Feedback, Listened, Attentive, Accepted Support and Alert    Name of Group:  Life Skills (10:00-10:50)     Group Participants: 3 of 6. Merged with 2B's for a total of 6.    Description and Outcome:  Provided group with verbal and written psychoeducation material focused on positive reflection and weekend wellness planning including integrating new learning into their weekend plans in order to support mental wellbeing. Client presented as clam and alert. Client would benefit from additional opportunities to practice and implement content from this session  in every day life,relationships and mental health recovery.    Is this a Weekly Review of the Progress on the Treatment Plan?  NO.          "

## 2018-08-20 ENCOUNTER — TELEPHONE (OUTPATIENT)
Dept: BEHAVIORAL HEALTH | Facility: CLINIC | Age: 38
End: 2018-08-20

## 2018-08-20 NOTE — TELEPHONE ENCOUNTER
Writer called Mackenzie this morning to touch base with her regarding a group track change. She did not answer and so a voicemail message was left asking her to call back and explaining the situation.

## 2018-08-23 NOTE — DISCHARGE SUMMARY
Adult Mental Health Intensive Outpatient Discharge Summary/Instructions      Patient: Mackenzie Garnica MRN: [id number]   : 1980 Age: 37 year old Sex: female     Admission Date: 18  Discharge Date: 18  Diagnosis: 296.33 (F33.2) Major Depressive Disorder, Recurrent Episode, Severe _  300.01 (F41.0) Panic Disorder  300.02 (F41.1) Generalized Anxiety Disorder    Focus of Treatment / Progress    Personal Safety: You have identified a history of suicidal ideation, with a plan.  While in the program, you have denied any current safety concerns.     * Follow your safety plan     * Call crisis lines as needed:    [facility] [phone] [facility] [phone]  [facility] [phone] Crisis Connection [phone]  [facility] [phone] [facility] [phone]  [facility] [phone] National Suicide Prevention [phone]  [facility] [phone] Suicide Prevention [phone]  [facility] [phone]    Managing symptoms of:  Depression and anxiety.   You have built upon the skills learned in the partial hospitalization program, such as mindfulness, self compassion and self care.  You have reported an increased ability to manage symptoms and have reported an improvement in mood.      Community support/health:      Managing Symptoms and Preventing Relapse    * Go to all of your appointments    * Take all medications as directed.      * Carry a current list if medication with you    * Do not use illicit (street) drugs.  Avoid alcohol    * Report these symptoms to your care team. These are early signs of relapse:   Thoughts of suicide   Losing more sleep   Increased confusion   Mood getting worse   Feeling more aggressive   Other:      *Use these skills daily:      Copy of summary sent to:     Follow up with psychiatrist / main caregiver:  Next visit:    Follow up with your therapist:    Next visit:     Go to group therapy and / or support groups at:     See  your medical doctor about:     Other:    Your treatment team appreciates having the opportunity to work with you and wishes you the best.  You have been motivated, open to learning and a leader and support to peers in group.  Client Signature:_______________________  Date / Time:___________  Staff Signature:________________________   Date / Time:___________

## 2021-03-15 NOTE — PROGRESS NOTES
"  Adult Mental Health Outpatient Group Therapy Progress Note     Client Initial Individualized Goals for Treatment:  I would like to be able to manage my depression and anxiety\".     See Initial Treatment suggestions for the client during the time between Diagnostic Assessment and completion of the Master Individualized Treatment Plan.     Treatment Goals:  1. Client will notify staff when needing assistance to develop or implement a coping plan to manage suicidal or self injurious urges  2. When in life skills group client will learn and practice 1-2 coping strategies to improve time management,improve self esteem and better management of stress providing an update of progress weekly.  3. In psychotherapy groups, Mackenzie will identify skills that she can utlize to manage depression and anxiety, 2x weekly, reporting effectiveness each time  4. Mackenzie will set at least one weekly goal related to developing wellness behaviors to support symptom stability during treatment and after discharge.         Area of Treatment Focus:  Symptom Management, Personal Safety and Develop Socialization / Interpersonal Relationship Skills    Therapeutic Interventions/Treatment Strategies:  Support, Feedback, Safety Assessments and Clarification    Response to Treatment Strategies:  Accepted Feedback, Gave Feedback, Listened, Focused on Goals, Attentive, Accepted Support and Alert    Name of Group:  Psychotherapy [id number], 2 of 5 participants, sick child, unknown      Description and Outcome:  Mackenzie reports stomach upset today, but otherwise \"OK\"  She continues to consider return to work and to continue in day treatment for a couple of weeks to help with transition.  Explained to Mackenzie that she will need to consider her ability to commit to attendance.Discussed aftercare supports including individual therapy and support groups.  She states she has 8 more sessions available with [facility] therapist.  Agrees to begin to contact other " Intraoperative consult requested by Dr. Kait Rodriguez for evaluation of gynecologic anatomical integrity in patient with ruptured appendix.  Upon my arrival to the OR, Dr. Rodriguez had dissected the right adnexa free from surrounding adhesions.  No clubbing noted of the right Fallopian tube although hyperemia and edema noted of the tube from isthmus to ampulla.  Proximal kinking of the tube noted.  Good hemostasis noted.  The distal left tube and ovary were covered by adherent small bowel, however no dilation of the proximal left tube noted.  Due to significant inflammatory response from ruptured appendix, further surgical dissection could lead to loss of adnexa(e).  Should future fertility be desired, a hysterosalpingogram 6+ weeks from now when inflammatory changes have resolved could prove beneficial to assess tubal patency.  Upon discharge, should Ms. Huerta not have an established gynecologist, she should arrange follow up with Dr. Candy Rosado at [phone].  If you would like us to follow Ms. Huerta postoperatively during this hospitalization, please call myself or one of my partners on our in house phone, [phone].   therapists for support once 8 sessions are finished.  Mackenzie also expressed concerns about change in program staff starting next week.  She would like to consider transitioning to another group with female staff.  Mackenzie demonstrates understanding of session by effectively utilizing skills to manage anxiety and depression.  No safety concerns.    Is this a Weekly Review of the Progress on the Treatment Plan?  No

## 2021-07-22 NOTE — PROGRESS NOTES
"Adult Mental Health Outpatient Group Therapy Progress Note     Client Initial Individualized Goals for Treatment:  I would like to be able to manage my depression and anxiety\".    See Initial Treatment suggestions for the client during the time between Diagnostic Assessment and completion of the Master Individualized Treatment Plan.    Treatment Goals:  1. Client will notify staff when needing assistance to develop or implement a coping plan to manage suicidal or self injurious urges  2. When in life skills group client will learn and practice 1-2 coping strategies to improve time management,improve self esteem and better management of stress providing an update of progress weekly.  3. In psychotherapy groups, Mackenzie will identify skills that she can utlize to manage depression and anxiety, 2x weekly, reporting effectiveness each time  4. Mackenzie will set at least one weekly goal related to developing wellness behaviors to support symptom stability during treatment and after discharge.     Area of Treatment Focus:  Symptom Management    Therapeutic Interventions/Treatment Strategies:  Support, Feedback, Safety Assessments, Structured Activity and Education    Response to Treatment Strategies:  Accepted Feedback, Listened, Attentive, Accepted Support and Alert    Name of Group:  Mental Health Management (11:00-11:50)     Group Size:  4 clients    Description and Outcome:  Client presented as calm and alert with good focus and concentration. Psychoeducation skills addressed included communication skills. Client also attended her scheduled treatment planning meeting and goals were initiated.   Client would benefit from additional opportunities to practice and implement content from this session  in every day life,relationships and mental health recovery.    Is this a Weekly Review of the Progress on the Treatment Plan?  Yes.      Are Treatment Plan Goals being addressed?  Yes, continue treatment goals      Are " Treatment Plan Strategies to Address Goals Effective?  Yes, continue treatment strategies      Are there any current contracts in place?  No       Patient

## 2021-08-09 NOTE — ADDENDUM NOTE
Encounter addended by: Tali Keenan OTR/L on: 5/30/2018 10:16 PM<BR>     Actions taken: Sign clinical note Patient has not read message yet. Will call patient if no response soon.